# Patient Record
Sex: FEMALE | Race: WHITE | NOT HISPANIC OR LATINO | Employment: OTHER | URBAN - METROPOLITAN AREA
[De-identification: names, ages, dates, MRNs, and addresses within clinical notes are randomized per-mention and may not be internally consistent; named-entity substitution may affect disease eponyms.]

---

## 2022-11-14 ENCOUNTER — OFFICE VISIT (OUTPATIENT)
Dept: OPHTHALMOLOGY | Facility: CLINIC | Age: 67
End: 2022-11-14
Payer: MEDICARE

## 2022-11-14 DIAGNOSIS — H21.561 AFFERENT PUPILLARY DEFECT, RIGHT: ICD-10-CM

## 2022-11-14 DIAGNOSIS — Z96.1 PSEUDOPHAKIA, RIGHT EYE: ICD-10-CM

## 2022-11-14 DIAGNOSIS — H25.12 NUCLEAR SCLEROSIS OF LEFT EYE: ICD-10-CM

## 2022-11-14 DIAGNOSIS — H40.1113 PRIMARY OPEN-ANGLE GLAUCOMA, RIGHT EYE, SEVERE STAGE: Primary | ICD-10-CM

## 2022-11-14 DIAGNOSIS — H52.203 MYOPIA OF BOTH EYES WITH ASTIGMATISM AND PRESBYOPIA: ICD-10-CM

## 2022-11-14 DIAGNOSIS — H52.4 MYOPIA OF BOTH EYES WITH ASTIGMATISM AND PRESBYOPIA: ICD-10-CM

## 2022-11-14 DIAGNOSIS — H52.13 MYOPIA OF BOTH EYES WITH ASTIGMATISM AND PRESBYOPIA: ICD-10-CM

## 2022-11-14 DIAGNOSIS — H40.1121 PRIMARY OPEN-ANGLE GLAUCOMA, LEFT EYE, MILD STAGE: ICD-10-CM

## 2022-11-14 PROCEDURE — 99999 PR PBB SHADOW E&M-NEW PATIENT-LVL II: ICD-10-PCS | Mod: PBBFAC,,, | Performed by: OPHTHALMOLOGY

## 2022-11-14 PROCEDURE — 92250 COLOR FUNDUS PHOTOGRAPHY - OU - BOTH EYES: ICD-10-PCS | Mod: 26,S$PBB,, | Performed by: OPHTHALMOLOGY

## 2022-11-14 PROCEDURE — 92020 GONIOSCOPY: CPT | Mod: S$PBB,,, | Performed by: OPHTHALMOLOGY

## 2022-11-14 PROCEDURE — 92020 GONIOSCOPY: CPT | Mod: PBBFAC | Performed by: OPHTHALMOLOGY

## 2022-11-14 PROCEDURE — 92250 FUNDUS PHOTOGRAPHY W/I&R: CPT | Mod: PBBFAC | Performed by: OPHTHALMOLOGY

## 2022-11-14 PROCEDURE — 92004 COMPRE OPH EXAM NEW PT 1/>: CPT | Mod: S$PBB,,, | Performed by: OPHTHALMOLOGY

## 2022-11-14 PROCEDURE — 99202 OFFICE O/P NEW SF 15 MIN: CPT | Mod: PBBFAC | Performed by: OPHTHALMOLOGY

## 2022-11-14 PROCEDURE — 92020 PR SPECIAL EYE EVAL,GONIOSCOPY: ICD-10-PCS | Mod: S$PBB,,, | Performed by: OPHTHALMOLOGY

## 2022-11-14 PROCEDURE — 92004 PR EYE EXAM, NEW PATIENT,COMPREHESV: ICD-10-PCS | Mod: S$PBB,,, | Performed by: OPHTHALMOLOGY

## 2022-11-14 PROCEDURE — 99999 PR PBB SHADOW E&M-NEW PATIENT-LVL II: CPT | Mod: PBBFAC,,, | Performed by: OPHTHALMOLOGY

## 2022-11-14 RX ORDER — LATANOPROST 50 UG/ML
1 SOLUTION/ DROPS OPHTHALMIC NIGHTLY
COMMUNITY
End: 2022-11-14 | Stop reason: SDUPTHER

## 2022-11-14 RX ORDER — DORZOLAMIDE HYDROCHLORIDE AND TIMOLOL MALEATE 20; 5 MG/ML; MG/ML
1 SOLUTION/ DROPS OPHTHALMIC 2 TIMES DAILY
COMMUNITY
End: 2022-11-14 | Stop reason: SDUPTHER

## 2022-11-14 RX ORDER — LATANOPROST 50 UG/ML
1 SOLUTION/ DROPS OPHTHALMIC NIGHTLY
Qty: 5 ML | Refills: 3 | Status: SHIPPED | OUTPATIENT
Start: 2022-11-14 | End: 2023-03-21 | Stop reason: SDUPTHER

## 2022-11-14 RX ORDER — DORZOLAMIDE HYDROCHLORIDE AND TIMOLOL MALEATE 20; 5 MG/ML; MG/ML
1 SOLUTION/ DROPS OPHTHALMIC 2 TIMES DAILY
Qty: 30 ML | Refills: 3 | Status: SHIPPED | OUTPATIENT
Start: 2022-11-14 | End: 2023-03-21 | Stop reason: SDUPTHER

## 2022-11-14 RX ORDER — DORZOLAMIDE HYDROCHLORIDE AND TIMOLOL MALEATE 20; 5 MG/ML; MG/ML
1 SOLUTION/ DROPS OPHTHALMIC DAILY
COMMUNITY
End: 2022-11-14

## 2022-11-14 NOTE — PROGRESS NOTES
HPI    [facility] pt [facility] ([facility] [place])   Dx with glaucoma in her early 30's   H/O intol to combigan   S/P trab od about 2017 ([place])   S/P bleb revision (scar tissue) and phaco/IOL w/ Dr Romeo (a glaucoma   specialist joined the practice (actually dr hastings's daughter) - 2018   Pt here for Glaucoma Consult per Dr. Valentine Romeo at [facility];  Pt states no eye pain but eyes feel scratchy this AM.   Pt now lives in [place] - but her daughter and son in law - are both ED   doctors here at [facility] - Dr. Santana and Dr Santana  So she is frequently in [place] to visit and wants to transfer care   here     ++ Family history - grandmother (paternal) / uncle / brother / niece     Meds;  Dorzolamide/Timolol QAM OD  Dorzolamide/Timolol BID OS  Latanoprost QHS OS ONLY  Last edited by Josselin Martin MD on 11/14/2022  9:39 AM.            Assessment /Plan     For exam results, see Encounter Report.    Primary open-angle glaucoma, right eye, severe stage    Primary open-angle glaucoma, left eye, mild stage    Afferent pupillary defect, right    Nuclear sclerosis of left eye    Pseudophakia, right eye    Myopia of both eyes with astigmatism and presbyopia       1.   Glaucoma (type and duration)    POAG OD severe / POAG os - mild   - dx in her 30's about 1992 w/ Dr Hastings in [place]    First HVF   one outside test 12/3/2021 - [place] - dense SAD /mild IAD od // full os    First photos   11/2022   Treatment / Drops started   about 1992           Family history    + paternal granfather / uncle / brother / niece         Glaucoma meds    cosopt / latanoprost         H/O adverse rxn to glaucoma drops    intol to combigan         LASERS    S/P SLT or ALT ou - [place]         GLAUCOMA SURGERIES    trab od 2017 (Dr Hastings)  // revision trab 2018 (Dr. Romeo)         OTHER EYE SURGERIES    PC IOL - Dr Romeo - 2018 (w/ bleb revision)  - Dr Romeo - [place]         CDR     0.9/0.7        Tbase    ??          Tmax    31 od / ? os             Ttarget    ?             HVF    ? test 20? to  20? - ? od // ? Os   One older outside test 12/3/2021 - [place] gen dep / SAD / early IAD od // full os         Gonio    +3-4 ou         CCT    575/550         OCT    ? test 20/ to 20/ - RNFL - ? od // ? os        Disc photos    11/14/2022    - Ttoday    10 od (off gtts post trab and trab revision) // 18   - Test done today    gonio / DFE / outside chart review / DFE / disc photos     2. + APD od     3. NS os     4. PC IOL od    5. S/P trab and revision OD     PLAN  Cont drops   Using cosopt 1 x day od and using cosopt bid and latanoprost q hs os   Appears very stable since last surgery od     F/U  4 months with HVF / OCT - will NOT need dilating

## 2022-12-26 ENCOUNTER — HOSPITAL ENCOUNTER (EMERGENCY)
Facility: OTHER | Age: 67
Discharge: HOME | End: 2022-12-26
Attending: EMERGENCY MEDICINE
Payer: MEDICARE

## 2022-12-26 VITALS
TEMPERATURE: 98 F | HEART RATE: 87 BPM | DIASTOLIC BLOOD PRESSURE: 74 MMHG | BODY MASS INDEX: 22.76 KG/M2 | RESPIRATION RATE: 17 BRPM | WEIGHT: 145 LBS | OXYGEN SATURATION: 98 % | SYSTOLIC BLOOD PRESSURE: 145 MMHG | HEIGHT: 67 IN

## 2022-12-26 DIAGNOSIS — Z79.01 ON CONTINUOUS ORAL ANTICOAGULATION: ICD-10-CM

## 2022-12-26 DIAGNOSIS — E86.0 MILD DEHYDRATION: ICD-10-CM

## 2022-12-26 DIAGNOSIS — S09.90XA MINOR HEAD INJURY, INITIAL ENCOUNTER: Primary | ICD-10-CM

## 2022-12-26 DIAGNOSIS — R19.7 NAUSEA VOMITING AND DIARRHEA: ICD-10-CM

## 2022-12-26 DIAGNOSIS — R11.2 NAUSEA VOMITING AND DIARRHEA: ICD-10-CM

## 2022-12-26 LAB
ALBUMIN SERPL BCP-MCNC: 4 G/DL (ref 3.5–5.2)
ALP SERPL-CCNC: 75 U/L (ref 55–135)
ALT SERPL W/O P-5'-P-CCNC: 28 U/L (ref 10–44)
ANION GAP SERPL CALC-SCNC: 11 MMOL/L (ref 8–16)
AST SERPL-CCNC: 32 U/L (ref 10–40)
BASOPHILS # BLD AUTO: 0.03 K/UL (ref 0–0.2)
BASOPHILS NFR BLD: 0.3 % (ref 0–1.9)
BILIRUB SERPL-MCNC: 0.6 MG/DL (ref 0.1–1)
BUN SERPL-MCNC: 17 MG/DL (ref 8–23)
CALCIUM SERPL-MCNC: 9.1 MG/DL (ref 8.7–10.5)
CHLORIDE SERPL-SCNC: 108 MMOL/L (ref 95–110)
CO2 SERPL-SCNC: 23 MMOL/L (ref 23–29)
CREAT SERPL-MCNC: 0.8 MG/DL (ref 0.5–1.4)
DIFFERENTIAL METHOD: ABNORMAL
EOSINOPHIL # BLD AUTO: 0.2 K/UL (ref 0–0.5)
EOSINOPHIL NFR BLD: 1.4 % (ref 0–8)
ERYTHROCYTE [DISTWIDTH] IN BLOOD BY AUTOMATED COUNT: 12.4 % (ref 11.5–14.5)
EST. GFR  (NO RACE VARIABLE): >60 ML/MIN/1.73 M^2
GLUCOSE SERPL-MCNC: 122 MG/DL (ref 70–110)
HCT VFR BLD AUTO: 43 % (ref 37–48.5)
HGB BLD-MCNC: 14.6 G/DL (ref 12–16)
IMM GRANULOCYTES # BLD AUTO: 0.05 K/UL (ref 0–0.04)
IMM GRANULOCYTES NFR BLD AUTO: 0.5 % (ref 0–0.5)
LYMPHOCYTES # BLD AUTO: 1.1 K/UL (ref 1–4.8)
LYMPHOCYTES NFR BLD: 10.4 % (ref 18–48)
MCH RBC QN AUTO: 32 PG (ref 27–31)
MCHC RBC AUTO-ENTMCNC: 34 G/DL (ref 32–36)
MCV RBC AUTO: 94 FL (ref 82–98)
MONOCYTES # BLD AUTO: 0.4 K/UL (ref 0.3–1)
MONOCYTES NFR BLD: 4.1 % (ref 4–15)
NEUTROPHILS # BLD AUTO: 8.9 K/UL (ref 1.8–7.7)
NEUTROPHILS NFR BLD: 83.3 % (ref 38–73)
NRBC BLD-RTO: 0 /100 WBC
PLATELET # BLD AUTO: 183 K/UL (ref 150–450)
PMV BLD AUTO: 10.9 FL (ref 9.2–12.9)
POTASSIUM SERPL-SCNC: 3.5 MMOL/L (ref 3.5–5.1)
PROT SERPL-MCNC: 7.6 G/DL (ref 6–8.4)
RBC # BLD AUTO: 4.56 M/UL (ref 4–5.4)
SODIUM SERPL-SCNC: 142 MMOL/L (ref 136–145)
WBC # BLD AUTO: 10.63 K/UL (ref 3.9–12.7)

## 2022-12-26 PROCEDURE — 99284 EMERGENCY DEPT VISIT MOD MDM: CPT | Mod: 25

## 2022-12-26 PROCEDURE — 80053 COMPREHEN METABOLIC PANEL: CPT | Performed by: EMERGENCY MEDICINE

## 2022-12-26 PROCEDURE — 25000003 PHARM REV CODE 250: Performed by: EMERGENCY MEDICINE

## 2022-12-26 PROCEDURE — 85025 COMPLETE CBC W/AUTO DIFF WBC: CPT | Performed by: EMERGENCY MEDICINE

## 2022-12-26 PROCEDURE — 63600175 PHARM REV CODE 636 W HCPCS: Performed by: EMERGENCY MEDICINE

## 2022-12-26 PROCEDURE — 96374 THER/PROPH/DIAG INJ IV PUSH: CPT

## 2022-12-26 RX ORDER — ONDANSETRON 2 MG/ML
4 INJECTION INTRAMUSCULAR; INTRAVENOUS
Status: COMPLETED | OUTPATIENT
Start: 2022-12-26 | End: 2022-12-26

## 2022-12-26 RX ORDER — ONDANSETRON 4 MG/1
4 TABLET, ORALLY DISINTEGRATING ORAL EVERY 6 HOURS PRN
Qty: 20 TABLET | Refills: 0 | Status: SHIPPED | OUTPATIENT
Start: 2022-12-26 | End: 2023-03-21

## 2022-12-26 RX ADMIN — SODIUM CHLORIDE 1000 ML: 0.9 INJECTION, SOLUTION INTRAVENOUS at 10:12

## 2022-12-26 RX ADMIN — ONDANSETRON 4 MG: 2 INJECTION INTRAMUSCULAR; INTRAVENOUS at 10:12

## 2022-12-27 NOTE — ED PROVIDER NOTES
"Encounter Date: 12/26/2022    SCRIBE #1 NOTE: I, Tamir Patel am scribing for, and in the presence of,  Tommy Martin II, MD. I have scribed the following portions of the note - Other sections scribed: HPI, ROS, PE.     History     Chief Complaint   Patient presents with    Fall     Pt to ER with complaint of a fall while walking down her steps. Pt reports that they have a GI bug going on through the house and has been having nausea, vomiting, diarrhea. Pt reports hurt shoulder and reports hitting her head. Pt denies LOC. Pt reports that she was most concerned because she is taking Xarelto. Pt in no acute distress with chest noted to equal rise and fall. Pt AAOx4.     Time seen by provider: 10:16 PM    This is a 67 y.o. female with recent DVT on Xarelto with a head injury resulting from a mechanical fall approximately two hours ago. Patient as descending the last step of the staircase when she tripped, landing on a nearby baby gate without resulting loss of consciousness. She recalls striking one of the posts with her left shoulder and breaking the body of the gait over the posterior left side of her head. Patient required some assistance getting up and has been ambulatory since, albeit it with slowed gait. While she endorses nausea and vomiting afterwards, she clarifies that she felt this "was imminent" and likely due to a rash of viral GI symptoms throughout her household over the past several weeks. Patient was given Zofran by her daughter, who is an [facility] emergency physician, but states she is currently very nauseous. She denies any headaches but does report left shoulder and hip pains. Patient also has worsening lower back pain and explains that she has been undergoing PT for severe lumbar scoliosis. She denies any changes in vision. PMHx also includes HLD which she does not take medication. This is the extent of the patient's complaints at this time.    The history is provided by the patient and a " relative.   Review of patient's allergies indicates:   Allergen Reactions    Vancomycin analogues Anaphylaxis    Neosporin (neomycin-polymyx) Rash     Past Medical History:   Diagnosis Date    Glaucoma      Past Surgical History:   Procedure Laterality Date    CATARACT EXTRACTION      CATARACT EXTRACTION W/ INTRAOCULAR LENS IMPLANT Right     Dr. Valentine Romeo     Family History   Problem Relation Age of Onset    Glaucoma Brother     Glaucoma Paternal Uncle     Glaucoma Maternal Grandmother     Glaucoma Other         Review of Systems   Constitutional:  Negative for fever.   HENT:  Negative for sore throat.    Respiratory:  Negative for shortness of breath.    Cardiovascular:  Negative for chest pain.   Gastrointestinal:  Positive for diarrhea, nausea and vomiting.   Genitourinary:  Negative for dysuria.   Musculoskeletal:  Positive for arthralgias and back pain.   Skin:  Negative for rash.   Neurological:  Negative for syncope, weakness and headaches.   Hematological:  Does not bruise/bleed easily.     Physical Exam     Initial Vitals [12/26/22 2205]   BP Pulse Resp Temp SpO2   (!) 138/91 93 18 98 °F (36.7 °C) 98 %      MAP       --         Physical Exam    Nursing note and vitals reviewed.  Constitutional: She appears well-developed and well-nourished. She is not diaphoretic. No distress.   HENT:   Head: Normocephalic and atraumatic.   No sign of craniofacial trauma. Moist mucous membranes.   Eyes: EOM are normal. Pupils are equal, round, and reactive to light.   No pallor or icterus.   Neck: Neck supple.   Left paraspinous cervical tenderness extending to posterior shoulder.   Normal range of motion.  Cardiovascular:  Normal rate, regular rhythm and normal heart sounds.     Exam reveals no gallop and no friction rub.       No murmur heard.  Pulmonary/Chest: Breath sounds normal. No respiratory distress. She has no wheezes. She has no rhonchi. She has no rales.   Musculoskeletal:         General: Tenderness present. No  edema.      Cervical back: Normal range of motion and neck supple. Muscular tenderness present. No spinous process tenderness.      Comments: Tenderness over the anterior left shoulder with full range of motion. No bony tenderness. Tenderness over left lateral thigh but no focal bony over the left hip or pelvis. Normal range of motion to the left hip and knee. No pain with axial loading of femur.     Lymphadenopathy:     She has no cervical adenopathy.   Neurological: She is alert and oriented to person, place, and time.   Skin: Skin is warm and dry.   Psychiatric: She has a normal mood and affect. Her behavior is normal. Judgment and thought content normal.       ED Course   Procedures  Labs Reviewed   CBC W/ AUTO DIFFERENTIAL - Abnormal; Notable for the following components:       Result Value    MCH 32.0 (*)     Gran # (ANC) 8.9 (*)     Immature Grans (Abs) 0.05 (*)     Gran % 83.3 (*)     Lymph % 10.4 (*)     All other components within normal limits   COMPREHENSIVE METABOLIC PANEL - Abnormal; Notable for the following components:    Glucose 122 (*)     All other components within normal limits          Imaging Results              CT Head Without Contrast (Final result)  Result time 12/26/22 22:45:24      Final result by Richelle Wren MD (12/26/22 22:45:24)                   Impression:      No acute intracranial abnormality detected.  Mild chronic small vessel ischemic changes.    Sinus disease.      Electronically signed by: Richelle Wren  Date:    12/26/2022  Time:    22:45               Narrative:    EXAMINATION:  CT OF THE HEAD WITHOUT    CLINICAL HISTORY:  Head trauma, minor (Age >= 65y);    TECHNIQUE:  5 mm unenhanced axial images were obtained from the skull base to the vertex.    COMPARISON:  None.    FINDINGS:  The ventricles, basal cisterns, and cortical sulci are within normal limits for patient's stated age.  Mild chronic small vessel ischemic changes are present.  There is no acute  intracranial hemorrhage, territorial infarct or mass effect, or midline shift. In the visualized paranasal sinuses, there is an air-fluid level in the right sphenoid sinus.  There is partial opacification mucoperiosteal thickening involving the inferior frontal, bilateral ethmoid, and the right maxillary sinus.                                       Medications   sodium chloride 0.9% bolus 1,000 mL 1,000 mL (0 mLs Intravenous Stopped 12/26/22 2250)   ondansetron injection 4 mg (4 mg Intravenous Given 12/26/22 2227)     Medical Decision Making:   History:   Old Medical Records: I decided to obtain old medical records.  Clinical Tests:   Lab Tests: Ordered and Reviewed  Radiological Study: Ordered and Reviewed     Patient presents after ground level fall, fell forward striking left side of head shoulder and thigh.  No loss of consciousness.  She did start having multiple episodes of vomiting soon after falling however reports she was already feeling nauseous and there have been numerous members of the household over the past week or so with nausea vomiting diarrhea.  On exam the patient does not have visible trauma above the clavicles.  She has paraspinous tenderness of the left neck.  She has nonspecific tenderness of left shoulder and proximal thigh, but no focal bony tenderness or limited range of motion.  She is able to bear full weight on the left hip in the emergency department.  She did appear dehydrated.  IV fluids begun.  Laboratory studies were unremarkable.  CT scan of the head did not show any acute traumatic findings.  After IV fluids and antiemetics the patient is feeling better.  She has tolerated clear liquids in the emergency department.  Will give a prescription for Zofran.  Understands return precautions       Scribe Attestation:   Scribe #1: I performed the above scribed service and the documentation accurately describes the services I performed. I attest to the accuracy of the note.             Physician Attestation for Scribe: I, SAFIA, reviewed documentation as scribed in my presence, which is both accurate and complete.         Clinical Impression:   Final diagnoses:  [S09.90XA] Minor head injury, initial encounter (Primary)  [Z79.01] On continuous oral anticoagulation  [R11.2, R19.7] Nausea vomiting and diarrhea  [E86.0] Mild dehydration        ED Disposition Condition    Discharge Stable          ED Prescriptions       Medication Sig Dispense Start Date End Date Auth. Provider    ondansetron (ZOFRAN-ODT) 4 MG TbDL Take 1 tablet (4 mg total) by mouth every 6 (six) hours as needed. 20 tablet 12/26/2022 -- Tommy Martin II, MD          Follow-up Information       Follow up With Specialties Details Why Contact Info    Ankit Maravilla MD Family Medicine In 1 week  [address] [facility]  [place] [address]  [phone]               Tommy Martin II, MD  12/27/22 011

## 2023-03-21 ENCOUNTER — CLINICAL SUPPORT (OUTPATIENT)
Dept: OPHTHALMOLOGY | Facility: CLINIC | Age: 68
End: 2023-03-21
Payer: MEDICARE

## 2023-03-21 ENCOUNTER — OFFICE VISIT (OUTPATIENT)
Dept: OPHTHALMOLOGY | Facility: CLINIC | Age: 68
End: 2023-03-21
Payer: MEDICARE

## 2023-03-21 DIAGNOSIS — H25.12 NUCLEAR SCLEROSIS OF LEFT EYE: ICD-10-CM

## 2023-03-21 DIAGNOSIS — H21.561 AFFERENT PUPILLARY DEFECT, RIGHT: ICD-10-CM

## 2023-03-21 DIAGNOSIS — H52.13 MYOPIA OF BOTH EYES WITH ASTIGMATISM AND PRESBYOPIA: ICD-10-CM

## 2023-03-21 DIAGNOSIS — H40.1113 PRIMARY OPEN-ANGLE GLAUCOMA, RIGHT EYE, SEVERE STAGE: Primary | ICD-10-CM

## 2023-03-21 DIAGNOSIS — H40.1121 PRIMARY OPEN-ANGLE GLAUCOMA, LEFT EYE, MILD STAGE: ICD-10-CM

## 2023-03-21 DIAGNOSIS — Z96.1 PSEUDOPHAKIA, RIGHT EYE: ICD-10-CM

## 2023-03-21 DIAGNOSIS — H52.4 MYOPIA OF BOTH EYES WITH ASTIGMATISM AND PRESBYOPIA: ICD-10-CM

## 2023-03-21 DIAGNOSIS — H40.1113 PRIMARY OPEN-ANGLE GLAUCOMA, RIGHT EYE, SEVERE STAGE: ICD-10-CM

## 2023-03-21 DIAGNOSIS — H52.203 MYOPIA OF BOTH EYES WITH ASTIGMATISM AND PRESBYOPIA: ICD-10-CM

## 2023-03-21 PROCEDURE — 99212 OFFICE O/P EST SF 10 MIN: CPT | Mod: PBBFAC | Performed by: OPHTHALMOLOGY

## 2023-03-21 PROCEDURE — 99999 PR PBB SHADOW E&M-EST. PATIENT-LVL II: ICD-10-PCS | Mod: PBBFAC,,, | Performed by: OPHTHALMOLOGY

## 2023-03-21 PROCEDURE — 99214 OFFICE O/P EST MOD 30 MIN: CPT | Mod: S$PBB,,, | Performed by: OPHTHALMOLOGY

## 2023-03-21 PROCEDURE — 99999 PR PBB SHADOW E&M-EST. PATIENT-LVL II: CPT | Mod: PBBFAC,,, | Performed by: OPHTHALMOLOGY

## 2023-03-21 PROCEDURE — 99214 PR OFFICE/OUTPT VISIT, EST, LEVL IV, 30-39 MIN: ICD-10-PCS | Mod: S$PBB,,, | Performed by: OPHTHALMOLOGY

## 2023-03-21 RX ORDER — LATANOPROST 50 UG/ML
1 SOLUTION/ DROPS OPHTHALMIC NIGHTLY
Qty: 2.5 ML | Refills: 12 | Status: SHIPPED | OUTPATIENT
Start: 2023-03-21 | End: 2024-03-26

## 2023-03-21 RX ORDER — DORZOLAMIDE HYDROCHLORIDE AND TIMOLOL MALEATE 20; 5 MG/ML; MG/ML
1 SOLUTION/ DROPS OPHTHALMIC 2 TIMES DAILY
Qty: 10 ML | Refills: 12 | Status: SHIPPED | OUTPATIENT
Start: 2023-03-21 | End: 2023-08-21 | Stop reason: SDUPTHER

## 2023-03-21 NOTE — PROGRESS NOTES
HPI    DLS: 11/14/2022    Pt here for HVF review/OCT;  Pt states both eyes are feeling very irritated for about a couple of   months now.     Meds;  Dorzolamide/Timolol QAM OD   Dorzolamide/Timolol TID OS   Latanoprost QHS OS ONLY  Refresh PRN OU      Last edited by Radha Albarran on 3/21/2023  3:46 PM.            Assessment /Plan     For exam results, see Encounter Report.    Primary open-angle glaucoma, right eye, severe stage    Primary open-angle glaucoma, left eye, mild stage    Afferent pupillary defect, right    Nuclear sclerosis of left eye    Pseudophakia, right eye    Myopia of both eyes with astigmatism and presbyopia        Glaucoma history - [facility]   [facility] [place]   Dx with glaucoma in her early 30's   H/O intol to combigan   S/P trab od about 2017 ([place])   S/P bleb revision (scar tissue) and phaco/IOL w/ Dr Romeo (a glaucoma   specialist joined the practice (actually dr hastings's daughter) - 2018   Pt here for Glaucoma Consult per Dr. Valentine Romeo at [facility];  Pt states no eye pain but eyes feel scratchy this AM.   Pt now lives in [place] - but her daughter and son in law - are both ED   doctors here at [facility] - Dr. Santana and Dr Santana  So she is frequently in [place] to visit and wants to transfer care   here     ++ Family history - grandmother (paternal) / uncle / brother / niece        1.   Glaucoma (type and duration)    POAG OD severe / POAG os - mild   - dx in her 30's about 1992 w/ Dr Hastings in [place]    First HVF   one outside test 12/3/2021 - [place] - dense SAD /mild IAD od // full os    First photos   11/2022   Treatment / Drops started   about 1992           Family history    + paternal granfather / uncle / brother / niece         Glaucoma meds    cosopt / latanoprost         H/O adverse rxn to glaucoma drops    intol to combigan         LASERS    S/P SLT or ALT ou - [place]         GLAUCOMA  SURGERIES    trab od 2017 (Dr Merrill)  // revision trab 2018 (Dr. Romeo)         OTHER EYE SURGERIES    PC IOL - Dr Romeo - 2018 (w/ bleb revision)  - Dr Romeo - [place]         CDR    0.9/0.7        Tbase    ??          Tmax    31 od / ? os             Ttarget    ?             HVF   - [facility]  1 test 2023 to  2023 - dense SAD - involves fix / ? IAD  od // ??sup paracentral defect / ? INS  Os   One older outside test 12/3/2021 - [place] gen dep / SAD / early IAD od // full os         Gonio    +3-4 ou         CCT    575/550         OCT    1 test 2023 to 2023 - RNFL - dec thru out  od // dec TS/TIviral  os        Disc photos    11/14/2022    - Ttoday    14 od (on cosopt 1 x day and lat 2-3 x a week) // 18 - on latanoprost and cosopt   - Test done today    gonio / DFE / outside chart review / DFE / disc photos     2. + APD od     3. NS os     4. PC IOL od    5. S/P trab and revision OD     PLAN  Cont drops   Rec change - use latanoprost ou ( this way her eye lashes will be similar ou )   Cont cosopt os 3 x day - (can add back od prn)   Appears very stable since last surgery od     PLAN ON MONITORING VF's  Q 8 MONTHS TILL GOOD BASELINE ESTABLISHED     F/U  4 months with IOP and gonio  (consider a trial of rhopressa os prn or ? Repeat slt os prn

## 2023-08-19 NOTE — PROGRESS NOTES
HPI    DLS: 03/21/2023 Dr. Martin    Eye med's: Latanoprost qhs OU                      Cosopt TID OS     POAG right eye severe  POAG os - mild +APD od  NS os   PC IOL od   Refractive error   S/P trab od ([facility]) // revision Dr Romeo) - [place]    67 y.o. female is here for 4 months IOP and gonio. Left eye was sensitive,   along with the left ear, no eye pain on today. Denies flashes/floaters. No   noticeable VA changes since last visit.       Last edited by Josselin Martin MD on 8/21/2023  4:53 PM.            Assessment /Plan     For exam results, see Encounter Report.    Primary open-angle glaucoma, right eye, severe stage    Primary open-angle glaucoma, left eye, mild stage    Afferent pupillary defect, right    Nuclear sclerosis of left eye    Pseudophakia, right eye    Myopia of both eyes with astigmatism and presbyopia        Glaucoma history - [facility]   Old pt of Dr Hastings - [place] ([facility] [place] [facility])   Dx with glaucoma in her early 30's   H/O intol to combigan   S/P trab od about 2017 ([place])   S/P bleb revision (scar tissue) and phaco/IOL w/ Dr Romeo (a glaucoma   specialist joined the practice (actually dr hastings's daughter) - 2018   Pt here for Glaucoma Consult per Dr. Valentine Romeo at [facility];  Pt states no eye pain but eyes feel scratchy this AM.   Pt now lives in [place] - but her daughter and son in law - are both ED   doctors here at [facility] - Dr. Santana and Dr Santana  So she is frequently in [place] to visit and wants to transfer care   here     ++ Family history - grandmother (paternal) / uncle / brother / niece        1.   Glaucoma (type and duration)    POAG OD severe / POAG os - mild   - dx in her 30's about 1992 w/ Dr Hastings in [place]    First HVF   one outside test 12/3/2021 - [place] - dense SAD /mild IAD od // full os    First photos   11/2022   Treatment / Drops started   about 1992           Family history    + paternal  granfather / uncle / brother / niece         Glaucoma meds    cosopt / latanoprost         H/O adverse rxn to glaucoma drops    intol to combigan         LASERS    S/P SLT or ALT ou - [place]         GLAUCOMA SURGERIES    trab od 2017 (Dr Merrill)  // revision trab 2018 (Dr. Romeo)         OTHER EYE SURGERIES    PC IOL - Dr Romeo - 2018 (w/ bleb revision)  - Dr Romeo - [place]         CDR    0.9/0.7        Tbase    ??          Tmax    31 od / ? os             Ttarget    ?             HVF   - [facility]  1 test 2023 to  2023 - dense SAD - involves fix / ? IAD  od // ??sup paracentral defect / ? INS  Os   One older outside test 12/3/2021 - [place] gen dep / SAD / early IAD od // full os         Gonio    +3-4 ou         CCT    575/550         OCT    1 test 2023 to 2023 - RNFL - dec thru out  od // dec TS/TI, rhondad G  os        Disc photos    11/14/2022    - Ttoday    13  od (on cosopt 1 x day and lat 2-3 x a week) // 21 - on latanoprost and cosopt   - Test done today    gonio / IOP check     2. + APD od     3. NS os     4. PC IOL od    5. S/P trab and revision OD     PLAN  Cont drops   Cont  latanoprost ou ( this way her eye lashes will be similar ou )   Cont cosopt os 3 x day - (can add back od prn)   Appears very stable since last surgery od     IOP higher than ideal os - rec trial of rhopressa os - sample givne and Rx sent   If still higher than idal os - can try repeat slt - great angle anatomy and last done many years ago     PLAN ON MONITORING VF's  Q 8 MONTHS TILL GOOD BASELINE ESTABLISHED     F/U  4 months with IOP on rhopressa os

## 2023-08-21 ENCOUNTER — OFFICE VISIT (OUTPATIENT)
Dept: OPHTHALMOLOGY | Facility: CLINIC | Age: 68
End: 2023-08-21
Payer: MEDICARE

## 2023-08-21 DIAGNOSIS — Z96.1 PSEUDOPHAKIA, RIGHT EYE: ICD-10-CM

## 2023-08-21 DIAGNOSIS — H40.1113 PRIMARY OPEN-ANGLE GLAUCOMA, RIGHT EYE, SEVERE STAGE: Primary | ICD-10-CM

## 2023-08-21 DIAGNOSIS — H40.1121 PRIMARY OPEN-ANGLE GLAUCOMA, LEFT EYE, MILD STAGE: ICD-10-CM

## 2023-08-21 DIAGNOSIS — H52.203 MYOPIA OF BOTH EYES WITH ASTIGMATISM AND PRESBYOPIA: ICD-10-CM

## 2023-08-21 DIAGNOSIS — H52.13 MYOPIA OF BOTH EYES WITH ASTIGMATISM AND PRESBYOPIA: ICD-10-CM

## 2023-08-21 DIAGNOSIS — H52.4 MYOPIA OF BOTH EYES WITH ASTIGMATISM AND PRESBYOPIA: ICD-10-CM

## 2023-08-21 DIAGNOSIS — H21.561 AFFERENT PUPILLARY DEFECT, RIGHT: ICD-10-CM

## 2023-08-21 DIAGNOSIS — H25.12 NUCLEAR SCLEROSIS OF LEFT EYE: ICD-10-CM

## 2023-08-21 PROCEDURE — 92020 PR SPECIAL EYE EVAL,GONIOSCOPY: ICD-10-PCS | Mod: S$PBB,,, | Performed by: OPHTHALMOLOGY

## 2023-08-21 PROCEDURE — 99214 OFFICE O/P EST MOD 30 MIN: CPT | Mod: S$PBB,,, | Performed by: OPHTHALMOLOGY

## 2023-08-21 PROCEDURE — 99999 PR PBB SHADOW E&M-EST. PATIENT-LVL II: ICD-10-PCS | Mod: PBBFAC,,, | Performed by: OPHTHALMOLOGY

## 2023-08-21 PROCEDURE — 92020 GONIOSCOPY: CPT | Mod: S$PBB,,, | Performed by: OPHTHALMOLOGY

## 2023-08-21 PROCEDURE — 99214 PR OFFICE/OUTPT VISIT, EST, LEVL IV, 30-39 MIN: ICD-10-PCS | Mod: S$PBB,,, | Performed by: OPHTHALMOLOGY

## 2023-08-21 PROCEDURE — 99999 PR PBB SHADOW E&M-EST. PATIENT-LVL II: CPT | Mod: PBBFAC,,, | Performed by: OPHTHALMOLOGY

## 2023-08-21 PROCEDURE — 99212 OFFICE O/P EST SF 10 MIN: CPT | Mod: PBBFAC | Performed by: OPHTHALMOLOGY

## 2023-08-21 RX ORDER — CALCIUM CARBONATE 600 MG
TABLET ORAL DAILY
COMMUNITY
Start: 2023-01-18

## 2023-08-21 RX ORDER — DORZOLAMIDE HYDROCHLORIDE AND TIMOLOL MALEATE 20; 5 MG/ML; MG/ML
1 SOLUTION/ DROPS OPHTHALMIC 3 TIMES DAILY
Qty: 10 ML | Refills: 12 | Status: SHIPPED | OUTPATIENT
Start: 2023-08-21

## 2023-08-21 RX ORDER — NETARSUDIL 0.2 MG/ML
1 SOLUTION/ DROPS OPHTHALMIC; TOPICAL DAILY
Qty: 2.5 ML | Refills: 12 | Status: SHIPPED | OUTPATIENT
Start: 2023-08-21 | End: 2023-10-30 | Stop reason: SDUPTHER

## 2023-10-28 NOTE — PROGRESS NOTES
HPI    DLS: 8/21/2021    Pt here for IOP Check;  Pt states no eye pain or discomfort.     Meds;  Latanoprost QHS OU  Cosopt TID OS  Rhopressa QDAY OS    POAG right eye severe   POAG os - mild +APD od   NS os   PC IOL od   Refractive error   S/P trab od ([facility]ling) // revision Dr Romeo) - [place]     Last edited by Radha Albarran on 10/30/2023  2:05 PM.            Assessment /Plan     For exam results, see Encounter Report.    Primary open-angle glaucoma, right eye, severe stage    Primary open-angle glaucoma, left eye, mild stage    Afferent pupillary defect, right    Nuclear sclerosis of left eye    Pseudophakia, right eye          Glaucoma history - [facility]   Old pt of Dr Hastings - [place] ([facility] [place])   Dx with glaucoma in her early 30's   H/O intol to combigan   S/P trab od about 2017 ([place])   S/P bleb revision (scar tissue) and phaco/IOL w/ Dr Romeo (a glaucoma   specialist joined the practice (actually dr hastings's daughter) - 2018   Pt here for Glaucoma Consult per Dr. Valentine Romeo at [facility];  Pt states no eye pain but eyes feel scratchy this AM.   Pt now lives in [place] - but her daughter and son in law - are both ED   doctors here at [facility] - Dr. Santana and Dr Santana  So she is frequently in [place] to visit and wants to transfer care   here     ++ Family history - grandmother (paternal) / uncle / brother / niece        1.   Glaucoma (type and duration)    POAG OD severe / POAG os - mild   - dx in her 30's about 1992 w/ Dr Hastings in [place]    First HVF   one outside test 12/3/2021 - [place] - dense SAD /mild IAD od // full os    First photos   11/2022   Treatment / Drops started   about 1992           Family history    + paternal granfather / uncle / brother / niece         Glaucoma meds    cosopt / latanoprost         H/O adverse rxn to glaucoma drops    intol to combigan         LASERS    S/P SLT or ALT ou - [place]         GLAUCOMA  SURGERIES    trab od 2017 (Dr Merrill)  // revision trab 2018 (Dr. Romeo)         OTHER EYE SURGERIES    PC IOL - Dr Romeo - 2018 (w/ bleb revision)  - Dr Romeo - [place]         CDR    0.9/0.7        Tbase    ??          Tmax    31 od / ? os             Ttarget    ?             HVF   - [facility]  1 test 2023 to  2023 - dense SAD - involves fix / ? IAD  od // ??sup paracentral defect / ? INS  Os   One older outside test 12/3/2021 - [place] gen dep / SAD / early IAD od // full os         Gonio    +3-4 ou         CCT    575/550         OCT    1 test 2023 to 2023 - RNFL - dec thru out  od // dec TS/TIviral G  os        Disc photos    11/14/2022    - Ttoday    13  od (on cosopt 1 x day and lat 2-3 x a week) // 18-19  - on latanoprost and cosopt   - Test done today    gonio // fair resp to rhopressa os 21--> 18    2. + APD od     3. NS os     4. PC IOL od    5. S/P trab and revision OD     PLAN  Cont drops   Cont  latanoprost ou ( this way her eye lashes will be similar ou )   Cont cosopt os 3 x day - (can add back od prn)   Appears very stable since last surgery od     IOP higher than ideal os - cont rhopressa os - fir resp 21--> 18-19  - sample givne and Rx sent   If still higher than ideal os - or if rhopressa is TOO expensive - can try repeat slt - great angle anatomy and last done many years ago     Pts brother lives in [place] and recently put on rocklatan - but was told it would be $150 per bottle    PLAN ON MONITORING VF's  Q 8 MONTHS TILL GOOD BASELINE ESTABLISHED     F/U  4 months with IOP // HVF // DFE // OCT

## 2023-10-30 ENCOUNTER — OFFICE VISIT (OUTPATIENT)
Dept: OPHTHALMOLOGY | Facility: CLINIC | Age: 68
End: 2023-10-30
Payer: MEDICARE

## 2023-10-30 DIAGNOSIS — Z96.1 PSEUDOPHAKIA, RIGHT EYE: ICD-10-CM

## 2023-10-30 DIAGNOSIS — H40.1121 PRIMARY OPEN-ANGLE GLAUCOMA, LEFT EYE, MILD STAGE: ICD-10-CM

## 2023-10-30 DIAGNOSIS — H25.12 NUCLEAR SCLEROSIS OF LEFT EYE: ICD-10-CM

## 2023-10-30 DIAGNOSIS — H40.1113 PRIMARY OPEN-ANGLE GLAUCOMA, RIGHT EYE, SEVERE STAGE: Primary | ICD-10-CM

## 2023-10-30 DIAGNOSIS — H21.561 AFFERENT PUPILLARY DEFECT, RIGHT: ICD-10-CM

## 2023-10-30 PROCEDURE — 99999 PR PBB SHADOW E&M-EST. PATIENT-LVL II: ICD-10-PCS | Mod: PBBFAC,,, | Performed by: OPHTHALMOLOGY

## 2023-10-30 PROCEDURE — 99214 PR OFFICE/OUTPT VISIT, EST, LEVL IV, 30-39 MIN: ICD-10-PCS | Mod: S$PBB,,, | Performed by: OPHTHALMOLOGY

## 2023-10-30 PROCEDURE — 99214 OFFICE O/P EST MOD 30 MIN: CPT | Mod: S$PBB,,, | Performed by: OPHTHALMOLOGY

## 2023-10-30 PROCEDURE — 99999 PR PBB SHADOW E&M-EST. PATIENT-LVL II: CPT | Mod: PBBFAC,,, | Performed by: OPHTHALMOLOGY

## 2023-10-30 PROCEDURE — 99212 OFFICE O/P EST SF 10 MIN: CPT | Mod: PBBFAC | Performed by: OPHTHALMOLOGY

## 2023-10-30 RX ORDER — NETARSUDIL 0.2 MG/ML
1 SOLUTION/ DROPS OPHTHALMIC; TOPICAL DAILY
Qty: 2.5 ML | Refills: 12 | Status: SHIPPED | OUTPATIENT
Start: 2023-10-30

## 2024-03-04 ENCOUNTER — OFFICE VISIT (OUTPATIENT)
Dept: OPHTHALMOLOGY | Facility: CLINIC | Age: 69
End: 2024-03-04
Payer: MEDICARE

## 2024-03-04 ENCOUNTER — CLINICAL SUPPORT (OUTPATIENT)
Dept: OPHTHALMOLOGY | Facility: CLINIC | Age: 69
End: 2024-03-04
Payer: MEDICARE

## 2024-03-04 DIAGNOSIS — H21.561 AFFERENT PUPILLARY DEFECT, RIGHT: Primary | ICD-10-CM

## 2024-03-04 DIAGNOSIS — Z96.1 PSEUDOPHAKIA, RIGHT EYE: ICD-10-CM

## 2024-03-04 DIAGNOSIS — H25.12 NUCLEAR SCLEROSIS OF LEFT EYE: ICD-10-CM

## 2024-03-04 DIAGNOSIS — H40.1113 PRIMARY OPEN-ANGLE GLAUCOMA, RIGHT EYE, SEVERE STAGE: ICD-10-CM

## 2024-03-04 DIAGNOSIS — H40.1121 PRIMARY OPEN-ANGLE GLAUCOMA, LEFT EYE, MILD STAGE: ICD-10-CM

## 2024-03-04 PROCEDURE — 92083 EXTENDED VISUAL FIELD XM: CPT | Mod: PBBFAC | Performed by: OPHTHALMOLOGY

## 2024-03-04 PROCEDURE — 99999 PR PBB SHADOW E&M-EST. PATIENT-LVL III: CPT | Mod: PBBFAC,,, | Performed by: OPHTHALMOLOGY

## 2024-03-04 PROCEDURE — 92133 CPTRZD OPH DX IMG PST SGM ON: CPT | Mod: PBBFAC | Performed by: OPHTHALMOLOGY

## 2024-03-04 PROCEDURE — 99213 OFFICE O/P EST LOW 20 MIN: CPT | Mod: PBBFAC,25 | Performed by: OPHTHALMOLOGY

## 2024-03-04 PROCEDURE — 99214 OFFICE O/P EST MOD 30 MIN: CPT | Mod: S$PBB,,, | Performed by: OPHTHALMOLOGY

## 2024-03-04 NOTE — PROGRESS NOTES
HPI    DLS: 10/30/2023    PT here for HVF review/OCT;  Pt states no eye pain but having some discomfort, redness to her OS and   feels like she has blisters on her RONY and feels like maybe its the   Rhopressa causing the symptoms.     Meds;  Latanoprost QHS OU  Cosopt TID OS  Rhopressa QDAY OS    POAG right eye severe   POAG os - mild +APD od   NS os   PC IOL od   Refractive error   S/P trab od (emilio on) // revision Dr Romeo) - [place]     Last edited by Radha Albarran on 3/4/2024  1:44 PM.            Assessment /Plan     For exam results, see Encounter Report.    Afferent pupillary defect, right    Primary open-angle glaucoma, right eye, severe stage  -     Cormier Visual Field - OU - Extended - Both Eyes  -     Posterior Segment OCT Optic Nerve- Both eyes    Primary open-angle glaucoma, left eye, mild stage  -     Cormier Visual Field - OU - Extended - Both Eyes  -     Posterior Segment OCT Optic Nerve- Both eyes    Nuclear sclerosis of left eye    Pseudophakia, right eye        Gl[facility] history - [facility]   Old pt of Dr Hastings - [place] ([facility])   Dx with glaucoma in her early 30's   H/O intol to combigan   S/P trab od about 2017 ([facility])   S/P bleb revision (scar tissue) and phaco/IOL w/ Dr Romeo (a glaucoma   specialist joined the practice (actually dr hastings's daughter) - 2018   Pt here for Glaucoma Consult per Dr. Valentine Romeo at [facility];  Pt states no eye pain but eyes feel scratchy this AM.   Pt now lives in [place] - but her daughter and son in law - are both ED   doctors here at [facility] - Dr. Santana and Dr Santana  So she is frequently in [place] to visit and wants to transfer care   here     ++ Family history - grandmother (paternal) / uncle / brother / niece        1.   Glaucoma (type and duration)    POAG OD severe / POAG os - mild   - dx in her 30's about 1992 w/ Dr Hastings in [place]    First HVF   one outside test 12/3/2021 - [place] -  dense SAD /mild IAD od // full os    First photos   11/2022   Treatment / Drops started   about 1992           Family history    + paternal granfather / uncle / brother / niece         Glaucoma meds    cosopt OS tid / latanoprost OU q hs /        H/O adverse rxn to glaucoma drops    intol to combigan // ? Mild irritation to rhopressa         LASERS    S/P SLT or ALT ou - [place]         GLAUCOMA SURGERIES    trab od 2017 (Dr Merrill)  // revision trab 2018 (Dr. Romeo)         OTHER EYE SURGERIES    PC IOL - Dr Romeo - 2018 (w/ bleb revision)  - Dr Romeo - [place]         CDR    0.9/0.7        Tbase    ??          Tmax    31 od / ? os             Ttarget    ?             HVF   - [facility]  2 test 2023 to  2024 - dense SAD - split  fix / ? IAD  od // full Os   One older outside test 12/3/2021 - [place] gen dep / SAD / early IAD od // full os         Gonio    +3-4 ou         CCT    575/550         OCT    2 test 2023 to 2024 - RNFL - dec thru out  od // dec TS/TI, bord G  os        Disc photos    11/14/2022    - Ttoday    12  od (on cosopt 1 x day and lat 2-3 x a week) // 21  - on latanoprost and cosopt / rhopressa   - Test done today   HVF / DFE / gonio     2. + APD od     3. NS os     4. PC IOL od    5. S/P trab and revision OD     PLAN  Cont drops   Cont  latanoprost ou ( this way her eye lashes will be similar ou )   Cont cosopt os 3 x day - (can add back OD  prn)   Appears very stable since last surgery od     IOP higher than ideal os - cont rhopressa os - fair resp 21--> 18-19  - sample givne and Rx sent   If still higher than ideal os - or if rhopressa is TOO expensive - can try repeat slt - great angle anatomy and last done many years ago     Pts brother lives in [place] and recently put on rocklatan - but was told it would be $150 per bottle    PLAN ON MONITORING VF's  Q 8 MONTHS TILL GOOD BASELINE ESTABLISHED     3/4/2024   Rhopressa appears to be causing some irritation to eye and eyelid   Minimal effect  from the rhopressa   Cost $300 a bottle   Stop rhopressa - monitor to see if irritation resolves   Rec repeat SLT os - last done elswhere years ago     F/U  SLT os

## 2024-03-04 NOTE — PROGRESS NOTES
Visual field test done.  Patient stated no latex allergies used coverlet  Fixation  poor os patient had some eye move ment in os

## 2024-03-26 DIAGNOSIS — H40.1121 PRIMARY OPEN-ANGLE GLAUCOMA, LEFT EYE, MILD STAGE: ICD-10-CM

## 2024-03-26 DIAGNOSIS — H40.1113 PRIMARY OPEN-ANGLE GLAUCOMA, RIGHT EYE, SEVERE STAGE: ICD-10-CM

## 2024-03-26 RX ORDER — LATANOPROST 50 UG/ML
1 SOLUTION/ DROPS OPHTHALMIC NIGHTLY
Qty: 2.5 ML | Refills: 12 | Status: SHIPPED | OUTPATIENT
Start: 2024-03-26

## 2024-06-24 ENCOUNTER — OFFICE VISIT (OUTPATIENT)
Dept: DERMATOLOGY | Facility: CLINIC | Age: 69
End: 2024-06-24
Payer: COMMERCIAL

## 2024-06-24 DIAGNOSIS — D48.9 NEOPLASM OF UNCERTAIN BEHAVIOR: Primary | ICD-10-CM

## 2024-06-24 DIAGNOSIS — L82.1 SEBORRHEIC KERATOSES: ICD-10-CM

## 2024-06-24 DIAGNOSIS — L57.8 DIFFUSE PHOTODAMAGE OF SKIN: ICD-10-CM

## 2024-06-24 DIAGNOSIS — Z12.83 SCREENING EXAM FOR SKIN CANCER: ICD-10-CM

## 2024-06-24 DIAGNOSIS — D18.01 CHERRY ANGIOMA: ICD-10-CM

## 2024-06-24 PROCEDURE — 88305 TISSUE EXAM BY PATHOLOGIST: CPT | Performed by: DERMATOLOGY

## 2024-06-24 PROCEDURE — 88305 TISSUE EXAM BY PATHOLOGIST: CPT | Mod: 26,,, | Performed by: DERMATOLOGY

## 2024-06-24 PROCEDURE — 99999 PR PBB SHADOW E&M-EST. PATIENT-LVL III: CPT | Mod: PBBFAC,,, | Performed by: DERMATOLOGY

## 2024-06-24 NOTE — PATIENT INSTRUCTIONS
Shave Biopsy Wound Care    Your doctor has performed a shave biopsy today.  A band aid and vaseline ointment has been placed over the site.  This should remain in place for NO LONGER THAN 48 hours.  It is fine to remove the bandaid after 24 hours, if the area is no longer bleeding. It is recommended that you keep the area dry (do not wet)) for the first 24 hours.  After 24 hours, wash the area with warm soap and water and apply Vaseline jelly.  Many patients prefer to use Neosporin or Bacitracin ointment.  This is acceptable; however, know that you can develop an allergy to this medication even if you have used it safely for years.  It is important to keep the area moist.  Letting it dry out and get air slows healing time, and will worsen the scar.        If you notice increasing redness, tenderness, pain, or yellow drainage at the biopsy site, please notify your doctor.  These are signs of an infection.    If your biopsy site is bleeding, apply firm pressure for 15 minutes straight.  Repeat for another 15 minutes, if it is still bleeding.   If the surgical site continues to bleed, then please contact your doctor.      For [facility] users:   You will receive your biopsy results in [facility] as soon as they are available. Please be assured that your physician/provider will review your results and will then determine what further treatment, evaluation, or planning is required. You should be contacted by your physician's/provider's office within 5 business days of receiving your results; If not, please reach out to directly. This is one more way [facility] is putting you first.     [facility] [place] [address]/ [phone] FAX/ [email or web address]           SEBORRHEIC KERATOSES        What causes seborrheic keratoses?    Seborrheic keratoses are harmless, common skin growths that first appear during adult life.  As time goes by, more growths appear.  Some persons have a very large number of  them.  Seborrheic keratoses appear on both covered and uncovered parts of the body; they are not caused by sunlight.  The tendency to develop seborrheic keratoses is inherited.    Seborrheic keratoses are harmless and never become malignant.  They begin as slightly raised, light brown spots.  Gradually they thicken and take on a rough wartlike surface.  They slowly darken and may turn black.  These color changes are harmless.  Seborrheic keratoses are superficial and look as if they were stuck on the skin.  Persons who have had several seborrheic keratoses can usually recognize this type of benign growth.  However, if you are concerned or unsure about any growth, consult me.    Treatment    Seborrheic keratoses can easily be removed in the office.  The only reason for removing a seborrheic keratosis is your wish to get rid of it.

## 2024-06-24 NOTE — PROGRESS NOTES
Subjective:      Patient ID:  Madeline Haas is a 68 y.o. female who presents for   Chief Complaint   Patient presents with    Skin Check     ubse     Patient here for Upper Body Skin Exam    Last seen by dermatologist: 4-5 years ago in [place], [place]    None - personal history of atypical moles removed  none - personal history of MM   none - family history of MM  yes - childhood blistering sunburns  yes - tanning bed use  none - personal history of NMSC    Patient with specific complaint of lesion(s)  Location: lower abdomen  Duration: less than 1 year  Symptoms: none  Relieving factors/Previous treatments: none      Patient with new are of concern:   Location: back  Previous treatments: none      Review of Systems   Skin:  Positive for daily sunscreen use and activity-related sunscreen use. Negative for recent sunburn and wears hat.   Hematologic/Lymphatic: Does not bruise/bleed easily.       Objective:   Physical Exam   Constitutional: She appears well-developed and well-nourished. No distress.   Neurological: She is alert and oriented to person, place, and time. She is not disoriented.   Psychiatric: She has a normal mood and affect.   Skin:   Areas Examined (abnormalities noted in diagram):   Head / Face Inspection Performed  Neck Inspection Performed  Chest / Axilla Inspection Performed  Back Inspection Performed  RUE Inspected  LUE Inspection Performed                     Diagram Legend     Erythematous scaling macule/papule c/w actinic keratosis       Vascular papule c/w angioma      Pigmented verrucoid papule/plaque c/w seborrheic keratosis      Yellow umbilicated papule c/w sebaceous hyperplasia      Irregularly shaped tan macule c/w lentigo     1-2 mm smooth white papules consistent with Milia      Movable subcutaneous cyst with punctum c/w epidermal inclusion cyst      Subcutaneous movable cyst c/w pilar cyst      Firm pink to brown papule c/w dermatofibroma      Pedunculated fleshy papule(s) c/w  skin tag(s)      Evenly pigmented macule c/w junctional nevus     Mildly variegated pigmented, slightly irregular-bordered macule c/w mildly atypical nevus      Flesh colored to evenly pigmented papule c/w intradermal nevus       Pink pearly papule/plaque c/w basal cell carcinoma      Erythematous hyperkeratotic cursted plaque c/w SCC      Surgical scar with no sign of skin cancer recurrence      Open and closed comedones      Inflammatory papules and pustules      Verrucoid papule consistent consistent with wart     Erythematous eczematous patches and plaques     Dystrophic onycholytic nail with subungual debris c/w onychomycosis     Umbilicated papule    Erythematous-base heme-crusted tan verrucoid plaque consistent with inflamed seborrheic keratosis     Erythematous Silvery Scaling Plaque c/w Psoriasis     See annotation            Assessment / Plan:      Pathology Orders:       Normal Orders This Visit    Specimen to Pathology, Dermatology     Comments:    Number of Specimens:->1  ------------------------->-------------------------  Spec 1 Procedure:->Biopsy  Spec 1 Clinical Impression:->non healing scaly papule 3 mm  r/o SK  Spec 1 Source:->mid abdomen    Questions:    Procedure Type: Dermatology and skin neoplasms    Number of Specimens: 1    ------------------------: -------------------------    Spec 1 Procedure: Biopsy    Spec 1 Clinical Impression: non healing scaly papule 3 mm r/o SK    Spec 1 Source: mid abdomen    Release to patient:           Neoplasm of uncertain behavior  -     Specimen to Pathology, Dermatology  Shave biopsy procedure note:    Shave biopsy performed after verbal consent including risk of infection, scar, recurrence, need for additional treatment of site. Area prepped with alcohol, anesthetized with approximately 1.0cc of 1% lidocaine with epinephrine. Lesional tissue shaved with razor blade. Hemostasis achieved with application of aluminum chloride followed by hyfrecation. No  complications. Dressing applied. Wound care explained.    Favor ISK > Nmsc    Seborrheic keratoses  These are benign inherited growths without a malignant potential. Reassurance given to patient. No treatment is necessary.     Cherry angioma  This is a benign vascular lesion. Reassurance given. No treatment required.     Screening exam for skin cancer    Upper body skin examination performed today including at least 6 points as noted in physical examination. Suspicious lesions noted.    Recommend daily sun protection/avoidance and use of at least SPF 30, broad spectrum sunscreen (OTC drug).     Diffuse photodamage of skin  SM HQ4%/tretinoin compound sent today to [facility] on cheeks             Follow up if symptoms worsen or fail to improve.

## 2024-06-26 LAB
FINAL PATHOLOGIC DIAGNOSIS: NORMAL
GROSS: NORMAL
Lab: NORMAL
MICROSCOPIC EXAM: NORMAL

## 2024-06-28 NOTE — PROGRESS NOTES
1. Skin, mid abdomen, shave biopsy:   - HYPERTROPHIC ACTINIC KERATOSIS WITH ACANTHOLYSIS    Msged patient to let me know when she can come back in for LN2 appt.

## 2024-07-01 ENCOUNTER — PATIENT MESSAGE (OUTPATIENT)
Dept: DERMATOLOGY | Facility: CLINIC | Age: 69
End: 2024-07-01
Payer: MEDICARE

## 2024-07-03 ENCOUNTER — OFFICE VISIT (OUTPATIENT)
Dept: DERMATOLOGY | Facility: CLINIC | Age: 69
End: 2024-07-03
Payer: MEDICARE

## 2024-07-03 DIAGNOSIS — L57.0 ACTINIC KERATOSIS: Primary | ICD-10-CM

## 2024-07-03 PROCEDURE — 99999 PR PBB SHADOW E&M-EST. PATIENT-LVL II: CPT | Mod: PBBFAC,,, | Performed by: DERMATOLOGY

## 2024-07-03 PROCEDURE — 99499 UNLISTED E&M SERVICE: CPT | Mod: S$PBB,,, | Performed by: DERMATOLOGY

## 2024-07-03 PROCEDURE — 99212 OFFICE O/P EST SF 10 MIN: CPT | Mod: PBBFAC | Performed by: DERMATOLOGY

## 2024-07-03 PROCEDURE — 17000 DESTRUCT PREMALG LESION: CPT | Mod: PBBFAC | Performed by: DERMATOLOGY

## 2024-07-03 PROCEDURE — 17000 DESTRUCT PREMALG LESION: CPT | Mod: S$PBB,,, | Performed by: DERMATOLOGY

## 2024-07-03 NOTE — PROGRESS NOTES
Subjective:      Patient ID:  Madeline Haas is a 68 y.o. female who presents for   Chief Complaint   Patient presents with    Follow-up     Follow-up    History of Present Illness: The patient presents for follow up of shaved bx to abdomen     The patient was last seen on: 6- for UBSE.    Other skin complaints: eczema on right hand.     Review of Systems    Objective:   Physical Exam   Constitutional: She appears well-developed and well-nourished. No distress.   Neurological: She is alert and oriented to person, place, and time. She is not disoriented.   Psychiatric: She has a normal mood and affect.   Skin:   Areas Examined (abnormalities noted in diagram):   Abdomen Inspection Performed            Diagram Legend     Erythematous scaling macule/papule c/w actinic keratosis       Vascular papule c/w angioma      Pigmented verrucoid papule/plaque c/w seborrheic keratosis      Yellow umbilicated papule c/w sebaceous hyperplasia      Irregularly shaped tan macule c/w lentigo     1-2 mm smooth white papules consistent with Milia      Movable subcutaneous cyst with punctum c/w epidermal inclusion cyst      Subcutaneous movable cyst c/w pilar cyst      Firm pink to brown papule c/w dermatofibroma      Pedunculated fleshy papule(s) c/w skin tag(s)      Evenly pigmented macule c/w junctional nevus     Mildly variegated pigmented, slightly irregular-bordered macule c/w mildly atypical nevus      Flesh colored to evenly pigmented papule c/w intradermal nevus       Pink pearly papule/plaque c/w basal cell carcinoma      Erythematous hyperkeratotic cursted plaque c/w SCC      Surgical scar with no sign of skin cancer recurrence      Open and closed comedones      Inflammatory papules and pustules      Verrucoid papule consistent consistent with wart     Erythematous eczematous patches and plaques     Dystrophic onycholytic nail with subungual debris c/w onychomycosis     Umbilicated papule    Erythematous-base  heme-crusted tan verrucoid plaque consistent with inflamed seborrheic keratosis     Erythematous Silvery Scaling Plaque c/w Psoriasis     See annotation      Assessment / Plan:        Actinic keratosis    Cryosurgery Procedure Note    Verbal consent from the patient is obtained including, but not limited to, risk of hypopigmentation/hyperpigmentation, scar, recurrence of lesion. The patient is aware of the precancerous quality and need for treatment of these lesions. Liquid nitrogen cryosurgery is applied to the 1 actinic keratoses, as detailed in the physical exam, to produce a freeze injury. The patient is aware that blisters may form and is instructed on wound care with gentle cleansing and use of vaseline ointment to keep moist until healed. The patient is supplied a handout on cryosurgery and is instructed to call if lesions do not completely resolve.           No follow-ups on file.  1 year

## 2024-07-22 NOTE — PATIENT INSTRUCTIONS

## 2024-09-06 ENCOUNTER — TELEPHONE (OUTPATIENT)
Dept: OPHTHALMOLOGY | Facility: CLINIC | Age: 69
End: 2024-09-06
Payer: MEDICARE

## 2024-09-06 NOTE — TELEPHONE ENCOUNTER
----- Message from Radhayaron Albarran sent at 9/6/2024 11:33 AM CDT -----  Contact: [phone]  Anup pt lives in [place] but here in [place] today she's a Dr. Martin pt but she's not in this week is there anyway she can be seen today. She believes she has conjunctivitis in both eyes and its getting worse.  ----- Message -----  From: Shawnee Amador  Sent: 9/6/2024   9:03 AM CDT  To: Mario Schwab Staff    Type:  Sooner Apoointment Request  Caller is requesting a sooner appointment.  Name of Caller:Madeline  When is the first available appointment?soon  Symptoms:Conjunctives both eye swollen with redness   Would the patient rather a call back or a response via [facility]? Call  Best Call Back Number: [phone]  Additional Information: Patient is asking for medication to be call in

## 2024-09-06 NOTE — TELEPHONE ENCOUNTER
----- Message from Radhayaron Albarran sent at 9/6/2024 11:33 AM CDT -----  Contact: [phone]  Anup pt lives in [place] but here in [place] today she's a Dr. Martin pt but she's not in this week is there anyway she can be seen today. She believes she has conjunctivitis in both eyes and its getting worse.  ----- Message -----  From: Shawnee Amador  Sent: 9/6/2024   9:03 AM CDT  To: Mario Schwab Staff    Type:  Sooner Apoointment Request  Caller is requesting a sooner appointment.  Name of Caller:Madeline  When is the first available appointment?soon  Symptoms:Conjunctives both eye swollen with redness   Would the patient rather a call back or a response via [facility]? Call  Best Call Back Number: [phone]  Additional Information: Patient is asking for medication to be call in

## 2024-09-08 NOTE — PROGRESS NOTES
HPI     Itchy Eye            Comments: Pt c/o red itchy eyes with discharge and gritty FB sensation           Comments    URGENT TODAY    Pt here today ac/o red itchy eyes with discharge and gritty FB sensation   Pt states it started in OS on 8/25/24 then moved to OD    1. Severe POAG OD / Mild POAG OS  2. APD OD  3. NS OS  4. PCIOL OD    MEDS:  Cosopt TID OS  Latanoprost QHS OU  Lastacraft Allergy drops PRN OU              Last edited by Maria G Redmond MA on 9/9/2024  3:16 PM.            Assessment /Plan     For exam results, see Encounter Report.    Viral conjunctivitis of both eyes    Primary open-angle glaucoma, right eye, severe stage    Primary open-angle glaucoma, left eye, mild stage    Afferent pupillary defect, right    Nuclear sclerosis of left eye    Pseudophakia, right eye    Myopia of both eyes with astigmatism and presbyopia       URGENT -VIRAL CONJUNCTIVITIS OU // RED / IRRITATED / GUNCKY OU - 9/9/2024     Glaucoma history - [facility]   Old pt of Dr Hastings - [place] ([facility])   Dx with glaucoma in her early 30's   H/O intol to combigan   S/P trab od about 2017 ([facility])   S/P bleb revision (scar tissue) and phaco/IOL w/ Dr Romeo (a glaucoma   specialist joined the practice (actually dr hastings's daughter) - 2018   Pt here for Glaucoma Consult per Dr. Valentine Romeo at [facility];  Pt states no eye pain but eyes feel scratchy this AM.   Pt now lives in [place] - but her daughter and son in law - are both ED   doctors here at [facility] - Dr. Santana and Dr Santana  So she is frequently in [place] to visit and wants to transfer care   here     ++ Family history - grandmother (paternal) / uncle / brother / niece        1.   Glaucoma (type and duration)    POAG OD severe / POAG os - mild   - dx in her 30's about 1992 w/ Dr Hastings in [place]    First HVF   one outside test 12/3/2021 - [place] - dense SAD /mild IAD od // full os    First photos    11/2022   Treatment / Drops started   about 1992           Family history    + paternal granfather / uncle / brother / niece         Glaucoma meds    cosopt OS tid / latanoprost OU q hs /        H/O adverse rxn to glaucoma drops    intol to [facility]igan // ? Mild irritation to rhopressa         LASERS    S/P SLT or ALT ou - [place]         GLAUCOMA SURGERIES    trab od 2017 (Dr Merrill)  // revision trab 2018 (Dr. Romeo)         OTHER EYE SURGERIES    PC IOL - Dr Romeo - 2018 (w/ bleb revision)  - Dr Romeo - [place]         CDR    0.9/0.7        Tbase    ??          Tmax    31 od / ? os             Ttarget    ?             HVF   - [facility]  2 test 2023 to  2024 - dense SAD - split  fix / ? IAD  od // full Os   One older outside test 12/3/2021 - [place] gen dep / SAD / early IAD od // full os         Gonio    +3-4 ou         CCT    575/550         OCT    2 test 2023 to 2024 - RNFL - dec thru out  od // dec TS/TIviral G  os        Disc photos    11/14/2022    - Ttoday    5  od (on cosopt 1 x day and lat 2-3 x a week) // 16   - on latanoprost and cosopt / rhopressa   - Test done today   [facility] - VIRAL CONJUNCTIVITIS OU (9/9/2024)     2. + APD od     3. NS os     4. PC IOL od    5. S/P trab and revision OD     PLAN    TAKE A BREAK FROM GLAUCOMA DROPS FOR 1 WEEK - acute viral conjunctivitis     latanoprost ou ( this way her eye lashes will be similar ou )     cosopt os 3 x day - (can add back OD  prn)   Appears very stable since last surgery od     IOP higher than ideal os - cont rhopressa os - fair resp 21--> 18-19  - sample givne and Rx sent   If still higher than ideal os - or if rhopressa is TOO expensive - can try repeat slt - great angle anatomy and last done many years ago     Pts brother lives in [place] and recently put on rocklatan - but was told it would be $150 per bottle    PLAN ON MONITORING VF's  Q 8 MONTHS TILL GOOD BASELINE ESTABLISHED     3/4/2024   Rhopressa appears to be causing some irritation to  eye and eyelid   Minimal effect from the rhopressa   Cost $300 a bottle   Stop rhopressa - monitor to see if irritation resolves   Rec repeat SLT os - last done elswhere years ago     9/9/2024   Pt did not yet get the slt  done os   Now with bilateral viral conjunctivitis   Hold glaucoma gtts for 1 week - can re-start once eyes doing better   PF AT's qid   EES or tobrex ointment q hs   Careful hand washing - highly contagious       Will wait and re-assess and re-schedule the slt os once viral conjunctivitis has resolved     F/U 2-3 weeks viral conjunctivitis check and IOP / ? Gonio

## 2024-09-09 ENCOUNTER — OFFICE VISIT (OUTPATIENT)
Dept: OPHTHALMOLOGY | Facility: CLINIC | Age: 69
End: 2024-09-09
Payer: MEDICARE

## 2024-09-09 DIAGNOSIS — H25.12 NUCLEAR SCLEROSIS OF LEFT EYE: ICD-10-CM

## 2024-09-09 DIAGNOSIS — B30.9 VIRAL CONJUNCTIVITIS OF BOTH EYES: Primary | ICD-10-CM

## 2024-09-09 DIAGNOSIS — H40.1113 PRIMARY OPEN-ANGLE GLAUCOMA, RIGHT EYE, SEVERE STAGE: ICD-10-CM

## 2024-09-09 DIAGNOSIS — H40.1121 PRIMARY OPEN-ANGLE GLAUCOMA, LEFT EYE, MILD STAGE: ICD-10-CM

## 2024-09-09 DIAGNOSIS — H52.203 MYOPIA OF BOTH EYES WITH ASTIGMATISM AND PRESBYOPIA: ICD-10-CM

## 2024-09-09 DIAGNOSIS — Z96.1 PSEUDOPHAKIA, RIGHT EYE: ICD-10-CM

## 2024-09-09 DIAGNOSIS — H52.13 MYOPIA OF BOTH EYES WITH ASTIGMATISM AND PRESBYOPIA: ICD-10-CM

## 2024-09-09 DIAGNOSIS — H21.561 AFFERENT PUPILLARY DEFECT, RIGHT: ICD-10-CM

## 2024-09-09 DIAGNOSIS — H52.4 MYOPIA OF BOTH EYES WITH ASTIGMATISM AND PRESBYOPIA: ICD-10-CM

## 2024-09-09 PROCEDURE — 99212 OFFICE O/P EST SF 10 MIN: CPT | Mod: PBBFAC | Performed by: OPHTHALMOLOGY

## 2024-09-09 PROCEDURE — 99214 OFFICE O/P EST MOD 30 MIN: CPT | Mod: S$PBB,,, | Performed by: OPHTHALMOLOGY

## 2024-09-09 PROCEDURE — 99999 PR PBB SHADOW E&M-EST. PATIENT-LVL II: CPT | Mod: PBBFAC,,, | Performed by: OPHTHALMOLOGY

## 2024-09-09 RX ORDER — CETIRIZINE HYDROCHLORIDE 10 MG/1
5 TABLET ORAL DAILY
COMMUNITY
Start: 2022-11-01

## 2024-09-09 RX ORDER — FERROUS SULFATE, DRIED 160(50) MG
1 TABLET, EXTENDED RELEASE ORAL
COMMUNITY

## 2024-09-09 RX ORDER — DIAZEPAM 5 MG/1
5 TABLET ORAL EVERY 8 HOURS
COMMUNITY
Start: 2024-05-15

## 2024-09-23 NOTE — PROGRESS NOTES
HPI    Pt here today for viral conj follow up with IOP ck. Pt states since last   visit her eyes are doing much better and feels clearer. Pt denies having   any pain or decreased va.       1. Severe POAG OD / Mild POAG OS  2. APD OD  3. NS OS  4. PCIOL OD    MEDS:  Cosopt TID OS  Latanoprost QHS OU  Lastacraft Allergy drops PRN OU      Last edited by Michelle Pinto on 9/26/2024  2:31 PM.            Assessment /Plan     For exam results, see Encounter Report.    Viral conjunctivitis of both eyes    Primary open-angle glaucoma, right eye, severe stage    Primary open-angle glaucoma, left eye, mild stage    Afferent pupillary defect, right    Nuclear sclerosis of left eye    Pseudophakia, right eye    Myopia of both eyes with astigmatism and presbyopia         URGENT -VIRAL CONJUNCTIVITIS OU // RED / IRRITATED / GUNCKY OU - 9/9/2024     Gl[facility] history - [facility]   [facility] pt of Dr [facility] - [facility] ([facility] [place]   Dx with glaucoma in her early 30's   H/O intol to combigan   S/P trab od about 2017 ([place])   S/P bleb revision (scar tissue) and phaco/IOL w/ Dr Romeo (a glaucoma   specialist joined the practice (actually dr hastings's daughter) - 2018   Pt here for Glaucoma Consult per Dr. Valentine Romeo at [facility];  Pt states no eye pain but eyes feel scratchy this AM.   Pt now lives in [place] - but her daughter and son in law - are both ED   doctors here at [facility] - Dr. Santana and Dr Santana  So she is frequently in [place] to visit and wants to transfer care   here     ++ Family history - grandmother (paternal) / uncle / brother / niece        1.   Glaucoma (type and duration)    POAG OD severe / POAG os - mild   - dx in her 30's about 1992 w/ Dr Hastings in [place]    First HVF   one outside test 12/3/2021 - [place] - dense SAD /mild IAD od // full os    First photos   11/2022   Treatment / Drops started   about 1992           Family history    + paternal granfather /  uncle / brother / niece         Glaucoma meds    cosopt OS tid / latanoprost OU q hs /        H/O adverse rxn to glaucoma drops    intol to combigan // ? Mild irritation to rhopressa         LASERS    S/P SLT or ALT ou - [place]         GLAUCOMA SURGERIES    trab od 2017 (Dr Merrill)  // revision trab 2018 (Dr. Romeo)         OTHER EYE SURGERIES    PC IOL - Dr Romeo - 2018 (w/ bleb revision)  - Dr Romeo - [place]         CDR    0.9/0.7        Tbase    ??          Tmax    31 od / ? os             Ttarget    ?             HVF   - [facility]  2 test 2023 to  2024 - dense SAD - split  fix / ? IAD  od // full Os   One older outside test 12/3/2021 - [place] gen dep / SAD / early IAD od // full os         Gonio    +3-4 ou         CCT    575/550         OCT    2 test 2023 to 2024 - RNFL - dec thru out  od // dec TS/TIviral G  os        Disc photos    11/14/2022    - Ttoday    12  od (on cosopt 1 x day and lat 2-3 x a week) // 15  - on latanoprost and cosopt / rhopressa   - Test done today   uc[facility] - VIRAL CONJUNCTIVITIS OU (9/9/2024) // resolved by 9/26/2024    2. + APD od     3. NS os     4. PC IOL od    5. S/P trab and revision OD     PLAN    TAKE A BREAK FROM GLAUCOMA DROPS FOR 1 WEEK - acute viral conjunctivitis     latanoprost ou ( this way her eye lashes will be similar ou )     cosopt os 3 x day - (can add back OD  prn)   Appears very stable since last surgery od     IOP higher than ideal os - cont rhopressa os - fair resp 21--> 18-19  - sample givne and Rx sent   If still higher than ideal os - or if rhopressa is TOO expensive - can try repeat slt - great angle anatomy and last done many years ago     Pts brother lives in [place] and recently put on rocklatan - but was told it would be $150 per bottle    PLAN ON MONITORING VF's  Q 8 MONTHS TILL GOOD BASELINE ESTABLISHED     3/4/2024   Rhopressa appears to be causing some irritation to eye and eyelid   Minimal effect from the rhopressa   Cost $300 a bottle   Stop  rhopressa - monitor to see if irritation resolves   Rec repeat SLT os - last done elswhere years ago     9/9/2024   Pt did not yet get the slt  done os   Now with bilateral viral conjunctivitis   Hold glaucoma gtts for 1 week - can re-start once eyes doing better   PF AT's qid   EES or tobrex ointment q hs   Careful hand washing - highly contagious     9/26/2024  IOP ok   Viral conjunctivitis resolved       F/U 4 months with HVF / DFE / OCT (hold off on scheduling slt since IOP better tooday )

## 2024-09-26 ENCOUNTER — OFFICE VISIT (OUTPATIENT)
Dept: OPHTHALMOLOGY | Facility: CLINIC | Age: 69
End: 2024-09-26
Payer: MEDICARE

## 2024-09-26 DIAGNOSIS — H21.561 AFFERENT PUPILLARY DEFECT, RIGHT: ICD-10-CM

## 2024-09-26 DIAGNOSIS — H40.1121 PRIMARY OPEN-ANGLE GLAUCOMA, LEFT EYE, MILD STAGE: ICD-10-CM

## 2024-09-26 DIAGNOSIS — H25.12 NUCLEAR SCLEROSIS OF LEFT EYE: ICD-10-CM

## 2024-09-26 DIAGNOSIS — H52.4 MYOPIA OF BOTH EYES WITH ASTIGMATISM AND PRESBYOPIA: ICD-10-CM

## 2024-09-26 DIAGNOSIS — H52.13 MYOPIA OF BOTH EYES WITH ASTIGMATISM AND PRESBYOPIA: ICD-10-CM

## 2024-09-26 DIAGNOSIS — Z96.1 PSEUDOPHAKIA, RIGHT EYE: ICD-10-CM

## 2024-09-26 DIAGNOSIS — H40.1113 PRIMARY OPEN-ANGLE GLAUCOMA, RIGHT EYE, SEVERE STAGE: ICD-10-CM

## 2024-09-26 DIAGNOSIS — H52.203 MYOPIA OF BOTH EYES WITH ASTIGMATISM AND PRESBYOPIA: ICD-10-CM

## 2024-09-26 DIAGNOSIS — B30.9 VIRAL CONJUNCTIVITIS OF BOTH EYES: Primary | ICD-10-CM

## 2024-09-26 PROCEDURE — 99999 PR PBB SHADOW E&M-EST. PATIENT-LVL I: CPT | Mod: PBBFAC,,, | Performed by: OPHTHALMOLOGY

## 2024-09-26 PROCEDURE — 99211 OFF/OP EST MAY X REQ PHY/QHP: CPT | Mod: PBBFAC | Performed by: OPHTHALMOLOGY

## 2024-09-26 PROCEDURE — 99214 OFFICE O/P EST MOD 30 MIN: CPT | Mod: S$PBB,,, | Performed by: OPHTHALMOLOGY

## 2025-01-09 NOTE — PROGRESS NOTES
Subjective:   Patient ID:  Madeline Haas is a 69 y.o. female who presents for evaluation of No chief complaint on file.    PROBLEM LIST:  HLD  MVP    HPI:  She presents today to establish care and for evaluation of chest discomfort.  Her daughter and son are both ER physicians here at [facility].  She has known hyperlipidemia and had an elevated lipoprotein little a level of 198.  She has also been diagnosed with mitral valve prolapse on an exercise stress test about 6 or 7 years ago.  She recently has been taking pravastatin initially 10 mg every other day and then tried increasing it to 10 mg daily, however she reports brain fog and muscle achiness even with this low dose of pravastatin.  She reports that she gets occasional discomfort in her jaw which goes down into her chest.  The symptoms can last for 20-30 minutes at a time.  They started a few years ago and occur seldomly.  She has had a few episodes in the past year.  They are not related to exertion or to food.  He also recently underwent evaluation with a coronary calcium score in [place], however she does not have those results available yet.  She follows a very healthy diet and eats only fish.  She lives in [place] but comes to [place] frequently to visit her children and grandchildren.  She does not exercise on a regular basis however she is active.  She does check her blood pressure at home and says she gets normal readings.    Her ECG done today and personally reviewed by me shows normal sinus rhythm at 64 beats per minute with some baseline artifact.        Past Medical History:   Diagnosis Date    Cataract     Glaucoma     Hyperlipidemia     MVP (mitral valve prolapse)        Past Surgical History:   Procedure Laterality Date    CATARACT EXTRACTION W/  INTRAOCULAR LENS IMPLANT Right 2018    CE AND BLEB REVISION ( OUTSIDE [facility])    CATARACT EXTRACTION W/ INTRAOCULAR LENS IMPLANT Right     Dr. Valentine Romeo    TRABECULECTOMY Right 2017     DONE OUTSIDE [facility]       Social History     Socioeconomic History    Marital status:    Tobacco Use    Smoking status: Never    Smokeless tobacco: Never   Substance and Sexual Activity    Alcohol use: Yes     Social Drivers of Health     Financial Resource Strain: Low Risk  (6/24/2024)    Overall Financial Resource Strain (CARDIA)     Difficulty of Paying Living Expenses: Not hard at all   Food Insecurity: No Food Insecurity (6/24/2024)    Hunger Vital Sign     Worried About Running Out of Food in the Last Year: Never true     Ran Out of Food in the Last Year: Never true   Physical Activity: Sufficiently Active (6/24/2024)    Exercise Vital Sign     Days of Exercise per Week: 5 days     Minutes of Exercise per Session: 30 min   Stress: No Stress Concern Present (6/24/2024)    [employer] [place] [employer] - Occupational Stress Questionnaire     Feeling of Stress : Only a little   Housing Stability: Unknown (6/24/2024)    Housing Stability Vital Sign     Unable to Pay for Housing in the Last Year: No       Family History   Problem Relation Name Age of Onset    Cancer Mother      Heart attack Father age 59     Glaucoma Brother      Glaucoma Paternal Uncle      Glaucoma Maternal Grandmother      Glaucoma Other Niece     Amblyopia Neg Hx      Blindness Neg Hx      Cataracts Neg Hx      Macular degeneration Neg Hx      Retinal detachment Neg Hx      Strabismus Neg Hx         Patient's Medications   New Prescriptions    No medications on file   Previous Medications    CETIRIZINE (ZYRTEC) 10 MG TABLET    Take 5 mg by mouth once daily.    DIAZEPAM (VALIUM) 5 MG TABLET    Take 5 mg by mouth every 8 (eight) hours.    DORZOLAMIDE-TIMOLOL 2-0.5% (COSOPT) 22.3-6.8 MG/ML OPHTHALMIC SOLUTION    Place 1 drop into the left eye 3 (three) times daily.    ERGOCALCIFEROL (VITAMIN D2) 50,000 UNIT CAP    Take 50,000 Units by mouth twice a week.    LATANOPROST 0.005 % OPHTHALMIC SOLUTION    Place 1 drop into both eyes  "every evening.    PRAVASTATIN (PRAVACHOL) 10 MG TABLET    Take 10 mg by mouth.   Modified Medications    No medications on file   Discontinued Medications    CALCIUM-VITAMIN D3 (OS-ROSA 500 + D3) 500 MG-5 MCG (200 UNIT) PER TABLET    Take 1 tablet by mouth 3 (three) times a week.       Review of Systems   Constitutional: Negative for malaise/fatigue and weight gain.   HENT:  Negative for hearing loss.    Eyes:  Negative for visual disturbance.   Cardiovascular:  Positive for chest pain and palpitations. Negative for claudication, dyspnea on exertion, leg swelling, near-syncope, orthopnea, paroxysmal nocturnal dyspnea and syncope.   Respiratory:  Negative for cough, shortness of breath, sleep disturbances due to breathing, snoring and wheezing.    Endocrine: Negative for cold intolerance, heat intolerance, polydipsia, polyphagia and polyuria.   Hematologic/Lymphatic: Negative for bleeding problem. Does not bruise/bleed easily.   Skin:  Negative for rash and suspicious lesions.   Musculoskeletal:  Negative for arthritis, falls, joint pain, muscle weakness and myalgias.   Gastrointestinal:  Negative for abdominal pain, change in bowel habit, constipation, diarrhea, heartburn, hematochezia, melena and nausea.   Genitourinary:  Negative for hematuria and nocturia.   Neurological:  Negative for excessive daytime sleepiness, dizziness, headaches, light-headedness, loss of balance and weakness.   Psychiatric/Behavioral:  Negative for depression. The patient is not nervous/anxious.    Allergic/Immunologic: Negative for environmental allergies.       BP (!) 145/92   Pulse 61   Ht 5' 6.5" (1.689 m)   Wt 62.9 kg (138 lb 10.7 oz)   SpO2 99%   BMI 22.05 kg/m²     Objective:   Physical Exam  Constitutional:       Appearance: She is well-developed.      Comments:      HENT:      Head: Normocephalic and atraumatic.   Eyes:      General: No scleral icterus.     Conjunctiva/sclera: Conjunctivae normal.      Pupils: Pupils are " equal, round, and reactive to light.   Neck:      Thyroid: No thyromegaly.      Vascular: No hepatojugular reflux or JVD.      Trachea: No tracheal deviation.   Cardiovascular:      Rate and Rhythm: Normal rate and regular rhythm.      Chest Wall: PMI is not displaced.      Pulses: Intact distal pulses.           Carotid pulses are 2+ on the right side and 2+ on the left side.       Radial pulses are 2+ on the right side and 2+ on the left side.        Dorsalis pedis pulses are 2+ on the right side and 2+ on the left side.        Posterior tibial pulses are 2+ on the right side and 2+ on the left side.      Heart sounds: Normal heart sounds.   Pulmonary:      Effort: Pulmonary effort is normal.      Breath sounds: Normal breath sounds.   Abdominal:      General: Bowel sounds are normal. There is no distension.      Palpations: Abdomen is soft. There is no mass.      Tenderness: There is no abdominal tenderness.   Musculoskeletal:         General: No tenderness.      Cervical back: Normal range of motion and neck supple.   Lymphadenopathy:      Cervical: No cervical adenopathy.   Skin:     General: Skin is warm and dry.      Findings: No erythema or rash.      Nails: There is no clubbing.   Neurological:      Mental Status: She is alert and oriented to person, place, and time.   Psychiatric:         Speech: Speech normal.         Behavior: Behavior normal.         Lab Results   Component Value Date     12/26/2022    K 3.5 12/26/2022     12/26/2022    CO2 23 12/26/2022    BUN 17 12/26/2022    CREATININE 0.8 12/26/2022     (H) 12/26/2022    AST 32 12/26/2022    ALT 28 12/26/2022    ALBUMIN 4.0 12/26/2022    PROT 7.6 12/26/2022    BILITOT 0.6 12/26/2022    WBC 10.63 12/26/2022    HGB 14.6 12/26/2022    HCT 43.0 12/26/2022    MCV 94 12/26/2022     12/26/2022       Assessment:     1. MVP (mitral valve prolapse) :  I do not hear significant MR murmur on exam.   2. Mixed hyperlipidemia :  She is  not tolerating a very low dose of pravastatin very well.  I asked her to forward me the results of her coronary calcium score when they are available to see if we should consider a different pharmacologic agent.   3. Chest pain, unspecified type :  Her symptoms sound atypical for angina, however she does have an elevated lipoprotein little a score.  I have ordered an exercise echo for further evaluation.   4. Elevated blood pressure reading :  Her repeat blood pressure reading today was quite elevated at 180 6/100.  She was asymptomatic with this.  I have asked her to check her blood pressure reading when she gets home and to message me through the portal to let me know if it is coming down.       Plan:     Diagnoses and all orders for this visit:    MVP (mitral valve prolapse)    Mixed hyperlipidemia    Chest pain, unspecified type  -     Stress Echo Which stress agent will be used? Treadmill Exercise; Future  -     EKG 12-lead    Elevated blood pressure reading        Thank you for allowing me to participate in this patient's care. Please do not hesitate to contact me with any questions or concerns.

## 2025-01-11 NOTE — PROGRESS NOTES
HPI    DLS: 9/26/24 w/ Dr. Martin    69 y.o. female presents for IOP Check w/ HVF, OCT, and DFE. Patient denies   changes to VA, eye pain, flashes, and floaters. Complains of occasional   irritation OD, had conjunctivitis, mild, OD for 1 week around [place]   time. Complains of frequent sinus headaches.    1. Severe POAG OD / Mild POAG OS  2. APD OD  3. NS OS  4. PCIOL OD    MEDS:  Cosopt TID OS  Latanoprost QHS OU      Last edited by Wendy Layne on 1/13/2025  2:47 PM.            Assessment /Plan     For exam results, see Encounter Report.    Primary open-angle glaucoma, right eye, severe stage  -     C[place]ier Visual Field - OU - Extended - Both Eyes  -     Posterior Segment OCT Optic Nerve- Both eyes    Primary open-angle glaucoma, left eye, mild stage    Afferent pupillary defect, right    Nuclear sclerosis of left eye    Pseudophakia, right eye         URGENT -VIRAL CONJUNCTIVITIS OU // RED / IRRITATED / GUNCKY OU - 9/9/2024 - resolved     Glaucoma history - [facility]   Old pt of Dr Hastings - [place] ([facility] [place])   Dx with glaucoma in her early 30's   H/O intol to combigan   S/P trab od about 2017 ([place])   S/P bleb revision (scar tissue) and phaco/IOL w/ Dr Romeo (a glaucoma   specialist joined the practice (actually dr hastings's daughter) - 2018   Pt here for Glaucoma Consult per Dr. Valentine Romeo at [facility];  Pt states no eye pain but eyes feel scratchy this AM.   Pt now lives in [place] - but her daughter and son in law - are both ED   doctors here at [facility] - Dr. Santana and Dr Santana  So she is frequently in [place] to visit and wants to transfer care   here     ++ Family history - grandmother (paternal) / uncle / brother / niece        1.   Glaucoma (type and duration)    POAG OD severe / POAG os - mild   - dx in her 30's about 1992 w/ Dr Hastings in [place]    First HVF   one outside test 12/3/2021 - [place] - dense SAD /mild IAD od // full os     First photos   11/2022   Treatment / Drops started   about 1992           Family history    + paternal granfather / uncle / brother / niece         Glaucoma meds    cosopt OS tid / latanoprost OU q hs /        H/O adverse rxn to glaucoma drops    intol to combigan // ? Mild irritation to rhopressa         LASERS    S/P SLT or ALT ou - [place]         GLAUCOMA SURGERIES    trab od 2017 (Dr Merrill)  // revision trab 2018 (Dr. Romeo)         OTHER EYE SURGERIES    PC IOL - Dr Romeo - 2018 (w/ bleb revision)  - Dr Romeo - [place]         CDR    0.9/0.7        Tbase    ??          Tmax    31 od / ? os             Ttarget    ?             HVF   - [facility]  3 test 2023 to  2025 - dense SAD / SALT / central scotoma  - split  fix   od // ? full Os   One older outside test 12/3/2021 - [place] gen dep / SAD / early IAD od // full os         Gonio    +3-4 ou         CCT    575/550         OCT    3 test 2023 to 2025 - RNFL - dec thru out  od // dec TS/TI, bord G  os        Disc photos    11/14/2022    - Ttoday    10   od (on cosopt 1 x day and lat 2-3 x a week) // 20  - on latanoprost and cosopt / rhopressa   - Test done today IOP / HVF / DFE / OCT   2. + APD od     3. NS os     4. PC IOL od    5. S/P trab and revision OD     PLAN    TAKE A BREAK FROM GLAUCOMA DROPS FOR 1 WEEK - acute viral conjunctivitis     latanoprost ou ( this way her eye lashes will be similar ou )     cosopt os 3 x day - (can add back OD  prn)   Appears very stable since last surgery od     IOP higher than ideal os - cont rhopressa os - fair resp 21--> 18-19  - sample givne and Rx sent   If still higher than ideal os - or if rhopressa is TOO expensive - can try repeat slt - great angle anatomy and last done many years ago     Pts brother lives in [place] and recently put on rocklatan - but was told it would be $150 per bottle    PLAN ON MONITORING VF's  Q 8 MONTHS TILL GOOD BASELINE ESTABLISHED     3/4/2024   Rhopressa appears to be causing some  irritation to eye and eyelid   Minimal effect from the rhopressa   Cost $300 a bottle   Stop rhopressa - monitor to see if irritation resolves   Rec repeat SLT os - last done el[place]here years ago     9/9/2024   Pt did not yet get the slt  done os   Now with bilateral viral conjunctivitis   Hold glaucoma gtts for 1 week - can re-start once eyes doing better   PF AT's qid   EES or tobrex ointment q hs   Careful hand washing - highly contagious     9/26/2024  IOP ok   Viral conjunctivitis resolved     1/13/2025  IOP good od // borderline os   Consider repeat slt os (had ou years ago)   HVF SALT and central loss od // near full os   OCT dec od / bord os     F/U 4 months - IOP / gonio - ? Consider re-scheduling slt os (cx 2/2 lower IOP a few visits back )

## 2025-01-13 ENCOUNTER — CLINICAL SUPPORT (OUTPATIENT)
Dept: OPHTHALMOLOGY | Facility: CLINIC | Age: 70
End: 2025-01-13
Payer: MEDICARE

## 2025-01-13 ENCOUNTER — OFFICE VISIT (OUTPATIENT)
Dept: OPHTHALMOLOGY | Facility: CLINIC | Age: 70
End: 2025-01-13
Payer: MEDICARE

## 2025-01-13 DIAGNOSIS — H25.12 NUCLEAR SCLEROSIS OF LEFT EYE: ICD-10-CM

## 2025-01-13 DIAGNOSIS — Z96.1 PSEUDOPHAKIA, RIGHT EYE: ICD-10-CM

## 2025-01-13 DIAGNOSIS — H40.1121 PRIMARY OPEN-ANGLE GLAUCOMA, LEFT EYE, MILD STAGE: ICD-10-CM

## 2025-01-13 DIAGNOSIS — H40.1113 PRIMARY OPEN-ANGLE GLAUCOMA, RIGHT EYE, SEVERE STAGE: Primary | ICD-10-CM

## 2025-01-13 DIAGNOSIS — H21.561 AFFERENT PUPILLARY DEFECT, RIGHT: ICD-10-CM

## 2025-01-13 PROCEDURE — 92133 CPTRZD OPH DX IMG PST SGM ON: CPT | Mod: PBBFAC | Performed by: OPHTHALMOLOGY

## 2025-01-13 PROCEDURE — 99212 OFFICE O/P EST SF 10 MIN: CPT | Mod: PBBFAC | Performed by: OPHTHALMOLOGY

## 2025-01-13 PROCEDURE — 92083 EXTENDED VISUAL FIELD XM: CPT | Mod: PBBFAC | Performed by: OPHTHALMOLOGY

## 2025-01-13 PROCEDURE — 99999 PR PBB SHADOW E&M-EST. PATIENT-LVL II: CPT | Mod: PBBFAC,,, | Performed by: OPHTHALMOLOGY

## 2025-01-13 PROCEDURE — 99214 OFFICE O/P EST MOD 30 MIN: CPT | Mod: S$PBB,,, | Performed by: OPHTHALMOLOGY

## 2025-01-13 NOTE — PROGRESS NOTES
Oct [place] and Bmo was done ou.    24-2 SS HVF was done ou.    Pirate patch was used, patient said she is sensitive to adhesives.    Last Rx in chart was used for VF    -2.00 +0.75 x 100  -3.00 +0.25 x 98

## 2025-01-14 ENCOUNTER — OFFICE VISIT (OUTPATIENT)
Dept: CARDIOLOGY | Facility: CLINIC | Age: 70
End: 2025-01-14
Payer: MEDICARE

## 2025-01-14 ENCOUNTER — HOSPITAL ENCOUNTER (OUTPATIENT)
Dept: CARDIOLOGY | Facility: HOSPITAL | Age: 70
Discharge: HOME | End: 2025-01-14
Attending: INTERNAL MEDICINE
Payer: MEDICARE

## 2025-01-14 VITALS — HEIGHT: 66 IN | WEIGHT: 135 LBS | BODY MASS INDEX: 21.69 KG/M2

## 2025-01-14 VITALS
HEIGHT: 67 IN | BODY MASS INDEX: 21.77 KG/M2 | SYSTOLIC BLOOD PRESSURE: 145 MMHG | WEIGHT: 138.69 LBS | DIASTOLIC BLOOD PRESSURE: 92 MMHG | HEART RATE: 61 BPM | OXYGEN SATURATION: 99 %

## 2025-01-14 DIAGNOSIS — R07.9 CHEST PAIN, UNSPECIFIED TYPE: ICD-10-CM

## 2025-01-14 DIAGNOSIS — I34.1 MVP (MITRAL VALVE PROLAPSE): Primary | ICD-10-CM

## 2025-01-14 DIAGNOSIS — E78.2 MIXED HYPERLIPIDEMIA: ICD-10-CM

## 2025-01-14 DIAGNOSIS — R03.0 ELEVATED BLOOD PRESSURE READING: ICD-10-CM

## 2025-01-14 LAB
ASCENDING AORTA: 3.68 CM
AV AREA BY CONTINUOUS VTI: 4.2 CM2
AV INDEX (PROSTH): 0.81
AV LVOT MEAN GRADIENT: 2 MMHG
AV LVOT PEAK GRADIENT: 4 MMHG
AV MEAN GRADIENT: 2.8 MMHG
AV PEAK GRADIENT: 5.8 MMHG
AV VALVE AREA BY VELOCITY RATIO: 4.4 CM²
AV VALVE AREA: 4.3 CM2
AV VELOCITY RATIO: 0.83
BSA FOR ECHO PROCEDURE: 1.69 M2
CV ECHO LV RWT: 0.32 CM
CV STRESS BASE HR: 60 BPM
DIASTOLIC BLOOD PRESSURE: 72 MMHG
DOP CALC AO PEAK VEL: 1.2 M/S
DOP CALC AO VTI: 23.3 CM
DOP CALC LVOT AREA: 5.3 CM2
DOP CALC LVOT DIAMETER: 2.6 CM
DOP CALC LVOT PEAK VEL: 1 M/S
DOP CALC LVOT STROKE VOLUME: 99.8 CM3
DOP CALCLVOT PEAK VEL VTI: 18.8 CM
E WAVE DECELERATION TIME: 289.32 MS
E/A RATIO: 0.92
E/E' RATIO: 6.6 M/S
ECHO EF ESTIMATED: 78 %
ECHO LV POSTERIOR WALL: 0.7 CM (ref 0.6–1.1)
EJECTION FRACTION: 63 %
FRACTIONAL SHORTENING: 47.7 % (ref 28–44)
INTERVENTRICULAR SEPTUM: 1 CM (ref 0.6–1.1)
LA MAJOR: 4.02 CM
LA MINOR: 4.34 CM
LA WIDTH: 3.92 CM
LEFT ATRIUM SIZE: 2.72 CM
LEFT ATRIUM VOLUME INDEX MOD: 23 ML/M2
LEFT ATRIUM VOLUME INDEX: 22.4 ML/M2
LEFT ATRIUM VOLUME MOD: 38.85 ML
LEFT ATRIUM VOLUME: 37.83 CM3
LEFT INTERNAL DIMENSION IN SYSTOLE: 2.3 CM (ref 2.1–4)
LEFT VENTRICLE DIASTOLIC VOLUME INDEX: 50.41 ML/M2
LEFT VENTRICLE DIASTOLIC VOLUME: 85.2 ML
LEFT VENTRICLE MASS INDEX: 70.2 G/M2
LEFT VENTRICLE SYSTOLIC VOLUME INDEX: 11 ML/M2
LEFT VENTRICLE SYSTOLIC VOLUME: 18.67 ML
LEFT VENTRICULAR INTERNAL DIMENSION IN DIASTOLE: 4.4 CM (ref 3.5–6)
LEFT VENTRICULAR MASS: 118.6 G
LV LATERAL E/E' RATIO: 6
LV SEPTAL E/E' RATIO: 7.33
MV PEAK A VEL: 0.72 M/S
MV PEAK E VEL: 0.66 M/S
OHS CV CPX 1 MINUTE RECOVERY HEART RATE: 130 BPM
OHS CV CPX 85 PERCENT MAX PREDICTED HEART RATE MALE: 127
OHS CV CPX ESTIMATED METS: 11
OHS CV CPX MAX PREDICTED HEART RATE: 149
OHS CV CPX PATIENT IS FEMALE: 1
OHS CV CPX PATIENT IS MALE: 0
OHS CV CPX PEAK DIASTOLIC BLOOD PRESSURE: 107 MMHG
OHS CV CPX PEAK HEAR RATE: 153 BPM
OHS CV CPX PEAK RATE PRESSURE PRODUCT: ABNORMAL
OHS CV CPX PEAK SYSTOLIC BLOOD PRESSURE: 219 MMHG
OHS CV CPX PERCENT MAX PREDICTED HEART RATE ACHIEVED: 107
OHS CV CPX RATE PRESSURE PRODUCT PRESENTING: 7560
OHS CV RV/LV RATIO: 0.7 CM
OHS QRS DURATION: 100 MS
OHS QTC CALCULATION: 447 MS
POST STRESS EJECTION FRACTION: 65 %
RA MAJOR: 4.29 CM
RA PRESSURE ESTIMATED: 3 MMHG
RA WIDTH: 3.14 CM
RIGHT ATRIAL AREA: 12.4 CM2
RIGHT VENTRICLE DIASTOLIC BASEL DIMENSION: 3.1 CM
RV TISSUE DOPPLER FREE WALL SYSTOLIC VELOCITY 1 (APICAL 4 CHAMBER VIEW): 13.75 CM/S
SINUS: 3.18 CM
STJ: 3.01 CM
STRESS ECHO POST EXERCISE DUR MIN: 6 MINUTES
STRESS ECHO POST EXERCISE DUR SEC: 54 SECONDS
SYSTOLIC BLOOD PRESSURE: 126 MMHG
TDI LATERAL: 0.11 M/S
TDI SEPTAL: 0.09 M/S
TDI: 0.1 M/S
TRICUSPID ANNULAR PLANE SYSTOLIC EXCURSION: 2.24 CM
Z-SCORE OF LEFT VENTRICULAR DIMENSION IN END DIASTOLE: -0.67
Z-SCORE OF LEFT VENTRICULAR DIMENSION IN END SYSTOLE: -1.87

## 2025-01-14 PROCEDURE — 93010 ELECTROCARDIOGRAM REPORT: CPT | Mod: S$PBB,,, | Performed by: INTERNAL MEDICINE

## 2025-01-14 PROCEDURE — 93320 DOPPLER ECHO COMPLETE: CPT | Mod: 26,,, | Performed by: INTERNAL MEDICINE

## 2025-01-14 PROCEDURE — 93325 DOPPLER ECHO COLOR FLOW MAPG: CPT | Mod: 26,,, | Performed by: INTERNAL MEDICINE

## 2025-01-14 PROCEDURE — 99214 OFFICE O/P EST MOD 30 MIN: CPT | Mod: PBBFAC,25 | Performed by: INTERNAL MEDICINE

## 2025-01-14 PROCEDURE — 99999 PR PBB SHADOW E&M-EST. PATIENT-LVL IV: CPT | Mod: PBBFAC,,, | Performed by: INTERNAL MEDICINE

## 2025-01-14 PROCEDURE — 93351 STRESS TTE COMPLETE: CPT | Mod: 26,,, | Performed by: INTERNAL MEDICINE

## 2025-01-14 PROCEDURE — 93005 ELECTROCARDIOGRAM TRACING: CPT | Mod: PBBFAC | Performed by: INTERNAL MEDICINE

## 2025-01-14 PROCEDURE — 93325 DOPPLER ECHO COLOR FLOW MAPG: CPT

## 2025-01-14 PROCEDURE — 99204 OFFICE O/P NEW MOD 45 MIN: CPT | Mod: S$PBB,,, | Performed by: INTERNAL MEDICINE

## 2025-01-14 RX ORDER — PRAVASTATIN SODIUM 10 MG/1
10 TABLET ORAL
COMMUNITY
Start: 2024-11-18

## 2025-01-14 RX ORDER — ERGOCALCIFEROL 1.25 MG/1
50000 CAPSULE ORAL
COMMUNITY

## 2025-01-14 NOTE — PATIENT INSTRUCTIONS
Please check your blood pressure when you get home and let me know what it is.    Please send me your coronary calcium score report when you get it.

## 2025-01-15 ENCOUNTER — TELEPHONE (OUTPATIENT)
Dept: CARDIOLOGY | Facility: CLINIC | Age: 70
End: 2025-01-15
Payer: MEDICARE

## 2025-01-15 DIAGNOSIS — R07.9 CHEST PAIN, UNSPECIFIED TYPE: Primary | ICD-10-CM

## 2025-01-15 DIAGNOSIS — R93.1 EQUIVOCAL STRESS ECHOCARDIOGRAM: ICD-10-CM

## 2025-01-15 RX ORDER — ASPIRIN 81 MG/1
81 TABLET ORAL DAILY
COMMUNITY

## 2025-01-15 NOTE — TELEPHONE ENCOUNTER
I called Ms Haas to review her stress echo results, however there was no answer and I could not leave a message. Her stress echo results were equivocal. She had failure to augment her EF and reported chest pain on the treadmill, however had a hypertensive response to exercise. Recommend a PET stress for further evaluation.

## 2025-01-15 NOTE — TELEPHONE ENCOUNTER
Patient called with test results.  Scheduled for PET stress 2/6.  Patient also noted she is taking ASA 81 mg daily.  Added to medication list. Pt verbalized understanding and restated she will take her BP readings later today or tomorrow and send in to Dr. Ventura.

## 2025-02-06 ENCOUNTER — PATIENT MESSAGE (OUTPATIENT)
Dept: CARDIOLOGY | Facility: CLINIC | Age: 70
End: 2025-02-06

## 2025-02-06 ENCOUNTER — HOSPITAL ENCOUNTER (OUTPATIENT)
Dept: CARDIOLOGY | Facility: HOSPITAL | Age: 70
Discharge: HOME | End: 2025-02-06
Attending: INTERNAL MEDICINE
Payer: MEDICARE

## 2025-02-06 VITALS
HEIGHT: 66 IN | SYSTOLIC BLOOD PRESSURE: 123 MMHG | DIASTOLIC BLOOD PRESSURE: 64 MMHG | HEART RATE: 66 BPM | WEIGHT: 135 LBS | BODY MASS INDEX: 21.69 KG/M2

## 2025-02-06 DIAGNOSIS — R93.1 EQUIVOCAL STRESS ECHOCARDIOGRAM: ICD-10-CM

## 2025-02-06 DIAGNOSIS — R07.9 CHEST PAIN, UNSPECIFIED TYPE: ICD-10-CM

## 2025-02-06 LAB
CFR FLOW - ANTERIOR: 3.64
CFR FLOW - INFERIOR: 3.55
CFR FLOW - LATERAL: 3.28
CFR FLOW - MAX: 4.4
CFR FLOW - MIN: 2.62
CFR FLOW - SEPTAL: 3.41
CFR FLOW - WHOLE HEART: 3.47
CV STRESS BASE HR: 57 BPM
DIASTOLIC BLOOD PRESSURE: 78 MMHG
EJECTION FRACTION- HIGH: 59 %
END DIASTOLIC INDEX-HIGH: 155 ML/M2
END DIASTOLIC INDEX-LOW: 91 ML/M2
END SYSTOLIC INDEX-HIGH: 78 ML/M2
END SYSTOLIC INDEX-LOW: 40 ML/M2
NUC REST DIASTOLIC VOLUME INDEX: 66
NUC REST EJECTION FRACTION: 70
NUC REST SYSTOLIC VOLUME INDEX: 20
NUC STRESS DIASTOLIC VOLUME INDEX: 78
NUC STRESS EJECTION FRACTION: 77 %
NUC STRESS SYSTOLIC VOLUME INDEX: 18
OHS CV CPX 1 MINUTE RECOVERY HEART RATE: 96 BPM
OHS CV CPX 85 PERCENT MAX PREDICTED HEART RATE MALE: 127
OHS CV CPX MAX PREDICTED HEART RATE: 149
OHS CV CPX PATIENT IS FEMALE: 1
OHS CV CPX PATIENT IS MALE: 0
OHS CV CPX PEAK DIASTOLIC BLOOD PRESSURE: 72 MMHG
OHS CV CPX PEAK HEAR RATE: 69 BPM
OHS CV CPX PEAK RATE PRESSURE PRODUCT: NORMAL
OHS CV CPX PEAK SYSTOLIC BLOOD PRESSURE: 148 MMHG
OHS CV CPX PERCENT MAX PREDICTED HEART RATE ACHIEVED: 48
OHS CV CPX RATE PRESSURE PRODUCT PRESENTING: 9234
OHS CV INITIAL DOSE: 18 MCG/KG/MIN
OHS CV MODERATELY REDUCED FLOW CAPACITY: 0 %
OHS CV NO ISCHEMIA MILDLY REDUCED FLOW CAPACTY: 0 %
OHS CV NO ISCHEMIA MINIMALLY REDUCED FLOW CAPACITY: 0 %
OHS CV NORMAL FLOW CAPACITY COMPARABLE TO HEALTHY YOUNG VOLUNTEERS: 100 %
OHS CV PEAK DOSE: 17.7 MCG/KG/MIN
OHS CV PET ID: 8128
OHS CV SEVERELY REDUCED FLOW CAPACITY LARGEST SINGLE CONTINUOUS REGION: 0 %
OHS CV SEVERELY REDUCED FLOW CAPACITY: 0 %
OHS CV TOTAL EXAM DLP: 199.31 MGY-CM
REST FLOW - ANTERIOR: 0.71 CC/MIN/G
REST FLOW - INFERIOR: 0.64 CC/MIN/G
REST FLOW - LATERAL: 0.94 CC/MIN/G
REST FLOW - MAX: 1.27 CC/MIN/G
REST FLOW - MIN: 0.38 CC/MIN/G
REST FLOW - SEPTAL: 0.6 CC/MIN/G
REST FLOW - WHOLE HEART: 0.72 CC/MIN/G
RETIRED EF AND QEF - SEE NOTES: 47 %
STRESS FLOW - ANTERIOR: 2.51 CC/MIN/G
STRESS FLOW - INFERIOR: 2.26 CC/MIN/G
STRESS FLOW - LATERAL: 3.04 CC/MIN/G
STRESS FLOW - MAX: 3.36 CC/MIN/G
STRESS FLOW - MIN: 1.35 CC/MIN/G
STRESS FLOW - SEPTAL: 2.04 CC/MIN/G
STRESS FLOW - WHOLE HEART: 2.46 CC/MIN/G
SYSTOLIC BLOOD PRESSURE: 162 MMHG

## 2025-02-06 PROCEDURE — 63600175 PHARM REV CODE 636 W HCPCS: Performed by: INTERNAL MEDICINE

## 2025-02-06 PROCEDURE — 78434 AQMBF PET REST & RX STRESS: CPT

## 2025-02-06 PROCEDURE — 78431 MYOCRD IMG PET RST&STRS CT: CPT | Mod: 26,,, | Performed by: INTERNAL MEDICINE

## 2025-02-06 PROCEDURE — 93018 CV STRESS TEST I&R ONLY: CPT | Mod: ,,, | Performed by: INTERNAL MEDICINE

## 2025-02-06 PROCEDURE — 93016 CV STRESS TEST SUPVJ ONLY: CPT | Mod: ,,, | Performed by: INTERNAL MEDICINE

## 2025-02-06 PROCEDURE — 78434 AQMBF PET REST & RX STRESS: CPT | Mod: 26,,, | Performed by: INTERNAL MEDICINE

## 2025-02-06 PROCEDURE — A9555 RB82 RUBIDIUM: HCPCS | Performed by: INTERNAL MEDICINE

## 2025-02-06 RX ORDER — AMINOPHYLLINE 25 MG/ML
75 INJECTION, SOLUTION INTRAVENOUS ONCE
Status: COMPLETED | OUTPATIENT
Start: 2025-02-06 | End: 2025-02-06

## 2025-02-06 RX ORDER — REGADENOSON 0.08 MG/ML
0.4 INJECTION, SOLUTION INTRAVENOUS ONCE
Status: COMPLETED | OUTPATIENT
Start: 2025-02-06 | End: 2025-02-06

## 2025-02-06 RX ADMIN — RUBIDIUM CHLORIDE RB-82 17.7 MILLICURIE: 150 INJECTION, SOLUTION INTRAVENOUS at 10:02

## 2025-02-06 RX ADMIN — REGADENOSON 0.4 MG: 0.08 INJECTION, SOLUTION INTRAVENOUS at 10:02

## 2025-02-06 RX ADMIN — RUBIDIUM CHLORIDE RB-82 18 MILLICURIE: 150 INJECTION, SOLUTION INTRAVENOUS at 10:02

## 2025-02-06 RX ADMIN — AMINOPHYLLINE 75 MG: 25 INJECTION, SOLUTION INTRAVENOUS at 10:02

## 2025-02-07 RX ORDER — EZETIMIBE 10 MG/1
10 TABLET ORAL DAILY
Qty: 90 TABLET | Refills: 3 | Status: SHIPPED | OUTPATIENT
Start: 2025-02-07 | End: 2026-02-07

## 2025-05-19 DIAGNOSIS — H40.1121 PRIMARY OPEN-ANGLE GLAUCOMA, LEFT EYE, MILD STAGE: ICD-10-CM

## 2025-05-19 DIAGNOSIS — H40.1113 PRIMARY OPEN-ANGLE GLAUCOMA, RIGHT EYE, SEVERE STAGE: ICD-10-CM

## 2025-05-19 RX ORDER — LATANOPROST 50 UG/ML
1 SOLUTION/ DROPS OPHTHALMIC
Qty: 7.5 ML | Refills: 3 | Status: SHIPPED | OUTPATIENT
Start: 2025-05-19

## 2025-05-19 RX ORDER — DORZOLAMIDE HYDROCHLORIDE AND TIMOLOL MALEATE 20; 5 MG/ML; MG/ML
SOLUTION/ DROPS OPHTHALMIC
Qty: 30 ML | Refills: 3 | Status: SHIPPED | OUTPATIENT
Start: 2025-05-19

## 2025-06-21 NOTE — PROGRESS NOTES
HPI    DLS: 1/13/2025    Pt here for 4 Month Check;  Pt states OU have been really irritated lately.     Meds;  Cosopt TID OS  Latanoprost QHS OU  Blink PRN OU    1. Severe POAG OD / Mild POAG OS   2. APD OD   3. NS OS   4. PCIOL OD     Last edited by Radha Albarran MA on 6/26/2025  3:34 PM.            Assessment /Plan     For exam results, see Encounter Report.    Primary open-angle glaucoma, right eye, severe stage    Primary open-angle glaucoma, left eye, mild stage    Afferent pupillary defect, right    Nuclear sclerosis of left eye    Pseudophakia, right eye    Myopia of both eyes with astigmatism and presbyopia          [facility] - [facility]   Old pt of Dr Hastings - [place] ([facility] [place])   Dx with glaucoma in her early 30's   H/O intol to combigan   S/P trab od about 2017 ([place])   S/P bleb revision (scar tissue) and phaco/IOL w/ Dr Romeo (a glaucoma   specialist joined the practice (actually dr hastings's daughter) - 2018   Pt here for Glaucoma Consult per Dr. Valentine Romeo at [facility];  Pt states no eye pain but eyes feel scratchy this AM.   Pt now lives in [place] - but her daughter and son in law - are both ED   doctors here at [facility] - Dr. Santana and Dr Santana  So she is frequently in [place] to visit and wants to transfer care   here     ++ Family history - grandmother (paternal) / uncle / brother / niece        1.   Glaucoma (type and duration)    POAG OD severe / POAG os - mild   - dx in her 30's about 1992 w/ Dr Hastings in [place]    First HVF   one outside test 12/3/2021 - [place] - dense SAD /mild IAD od // full os    First photos   11/2022   Treatment / Drops started   about 1992           Family history    + paternal granfather / uncle / brother / niece         Glaucoma meds    cosopt OS tid / latanoprost OU q hs /        H/O adverse rxn to glaucoma drops    intol to combigan // ? Mild irritation to rhopressa         LASERS    S/P SLT or ALT  ou - [place]         GLAUCOMA SURGERIES    trab od 2017 (Dr Merrill)  // revision trab 2018 (Dr. Romeo)         OTHER EYE SURGERIES    PC IOL - Dr Romeo - 2018 (w/ bleb revision)  - Dr Romeo - [place]         CDR    0.9/0.7        Tbase    14/ 14-15           Tmax    31 od / ? os             Ttarget    ?             HVF   - [facility]  3 test 2023 to  2025 - dense SAD / SALT / central scotoma  - split  fix   od // ? full Os   One older outside test 12/3/2021 - [place] gen dep / SAD / early IAD od // full os         Gonio    +3-4 ou         CCT    575/550         OCT    3 test 2023 to 2025 - RNFL - dec thru out  od // dec TS/TI, viral G  os        Disc photos    11/14/2022    - Ttoday    9  od on latanoprost  2-3 q hs ) // 17-20 os   - on latanoprost and cosopt  - Test done today IOP / HVF / DFE / OCT gonio   2. + APD od     3. NS os     4. PC IOL od    5. S/P trab and revision OD     6. H/O viral conjunctivitis ou 9/2024 - resolved     PLAN      latanoprost ou ( this way her eye lashes will be similar ou )     cosopt os 3 x day - (can add back OD  prn)   Appears very stable since last surgery od     IOP higher than ideal os - cont rhopressa os - fair resp 21--> 18-19  - sample givne and Rx sent   If still higher than ideal os - or if rhopressa is TOO expensive - can try repeat slt - great angle anatomy and last done many years ago     Pts brother lives in [place] and recently put on rocklatan - but was told it would be $150 per bottle    PLAN ON MONITORING VF's  Q 8 MONTHS TILL GOOD BASELINE ESTABLISHED     3/4/2024   Rhopressa appears to be causing some irritation to eye and eyelid   Minimal effect from the rhopressa   Cost $300 a bottle   Stop rhopressa - monitor to see if irritation resolves   Rec repeat SLT os - last done [place]here years ago     9/9/2024   Pt did not yet get the slt  done os   As had developed  bilateral viral conjunctivitis (since resolved)     1/13/2025  IOP good od // borderline os    Consider repeat slt os (had ou years ago)   HVF SALT and central loss od // near full os   OCT dec od / bord os     6/26/2025   Rec lower IOP os   Suggest repeat slt os - had one done years ago  (ideally target os 14-15 )     F/U  SLT os

## 2025-06-26 ENCOUNTER — OFFICE VISIT (OUTPATIENT)
Dept: OPHTHALMOLOGY | Facility: CLINIC | Age: 70
End: 2025-06-26
Payer: MEDICARE

## 2025-06-26 DIAGNOSIS — H25.12 NUCLEAR SCLEROSIS OF LEFT EYE: ICD-10-CM

## 2025-06-26 DIAGNOSIS — H52.203 MYOPIA OF BOTH EYES WITH ASTIGMATISM AND PRESBYOPIA: ICD-10-CM

## 2025-06-26 DIAGNOSIS — Z96.1 PSEUDOPHAKIA, RIGHT EYE: ICD-10-CM

## 2025-06-26 DIAGNOSIS — H40.1121 PRIMARY OPEN-ANGLE GLAUCOMA, LEFT EYE, MILD STAGE: ICD-10-CM

## 2025-06-26 DIAGNOSIS — H52.4 MYOPIA OF BOTH EYES WITH ASTIGMATISM AND PRESBYOPIA: ICD-10-CM

## 2025-06-26 DIAGNOSIS — H21.561 AFFERENT PUPILLARY DEFECT, RIGHT: ICD-10-CM

## 2025-06-26 DIAGNOSIS — H40.1113 PRIMARY OPEN-ANGLE GLAUCOMA, RIGHT EYE, SEVERE STAGE: Primary | ICD-10-CM

## 2025-06-26 DIAGNOSIS — H52.13 MYOPIA OF BOTH EYES WITH ASTIGMATISM AND PRESBYOPIA: ICD-10-CM

## 2025-06-26 PROCEDURE — 99999 PR PBB SHADOW E&M-EST. PATIENT-LVL II: CPT | Mod: PBBFAC,,, | Performed by: OPHTHALMOLOGY

## 2025-06-26 PROCEDURE — 99212 OFFICE O/P EST SF 10 MIN: CPT | Mod: PBBFAC | Performed by: OPHTHALMOLOGY

## 2025-06-26 PROCEDURE — 92020 GONIOSCOPY: CPT | Mod: PBBFAC | Performed by: OPHTHALMOLOGY

## 2025-08-14 ENCOUNTER — OFFICE VISIT (OUTPATIENT)
Dept: OPHTHALMOLOGY | Facility: CLINIC | Age: 70
End: 2025-08-14
Payer: MEDICARE

## 2025-08-14 DIAGNOSIS — H40.1113 PRIMARY OPEN-ANGLE GLAUCOMA, RIGHT EYE, SEVERE STAGE: Primary | ICD-10-CM

## 2025-08-14 DIAGNOSIS — B30.9 VIRAL CONJUNCTIVITIS OF BOTH EYES: ICD-10-CM

## 2025-08-14 DIAGNOSIS — Z96.1 PSEUDOPHAKIA, RIGHT EYE: ICD-10-CM

## 2025-08-14 DIAGNOSIS — H52.4 MYOPIA OF BOTH EYES WITH ASTIGMATISM AND PRESBYOPIA: ICD-10-CM

## 2025-08-14 DIAGNOSIS — H52.203 MYOPIA OF BOTH EYES WITH ASTIGMATISM AND PRESBYOPIA: ICD-10-CM

## 2025-08-14 DIAGNOSIS — H21.561 AFFERENT PUPILLARY DEFECT, RIGHT: ICD-10-CM

## 2025-08-14 DIAGNOSIS — H25.12 NUCLEAR SCLEROSIS OF LEFT EYE: ICD-10-CM

## 2025-08-14 DIAGNOSIS — H52.13 MYOPIA OF BOTH EYES WITH ASTIGMATISM AND PRESBYOPIA: ICD-10-CM

## 2025-08-14 DIAGNOSIS — H40.1121 PRIMARY OPEN-ANGLE GLAUCOMA, LEFT EYE, MILD STAGE: ICD-10-CM

## 2025-08-14 PROCEDURE — 99212 OFFICE O/P EST SF 10 MIN: CPT | Mod: PBBFAC | Performed by: OPHTHALMOLOGY

## 2025-08-14 PROCEDURE — 65855 TRABECULOPLASTY LASER SURG: CPT | Mod: PBBFAC,LT | Performed by: OPHTHALMOLOGY

## 2025-08-14 PROCEDURE — 99999 PR PBB SHADOW E&M-EST. PATIENT-LVL II: CPT | Mod: PBBFAC,,, | Performed by: OPHTHALMOLOGY

## 2025-08-14 RX ORDER — BETAMETHASONE DIPROPIONATE 0.5 MG/G
CREAM TOPICAL 2 TIMES DAILY
COMMUNITY
Start: 2025-08-05